# Patient Record
Sex: MALE | Race: BLACK OR AFRICAN AMERICAN | NOT HISPANIC OR LATINO | URBAN - METROPOLITAN AREA
[De-identification: names, ages, dates, MRNs, and addresses within clinical notes are randomized per-mention and may not be internally consistent; named-entity substitution may affect disease eponyms.]

---

## 2017-02-23 ENCOUNTER — OFFICE VISIT - HEALTHEAST (OUTPATIENT)
Dept: ADDICTION MEDICINE | Facility: CLINIC | Age: 37
End: 2017-02-23

## 2017-02-23 DIAGNOSIS — F10.20 MODERATE ALCOHOL USE DISORDER (H): ICD-10-CM

## 2017-03-03 ENCOUNTER — COMMUNICATION - HEALTHEAST (OUTPATIENT)
Dept: ADDICTION MEDICINE | Facility: CLINIC | Age: 37
End: 2017-03-03

## 2017-03-13 ENCOUNTER — OFFICE VISIT - HEALTHEAST (OUTPATIENT)
Dept: ADDICTION MEDICINE | Facility: CLINIC | Age: 37
End: 2017-03-13

## 2017-03-13 ENCOUNTER — COMMUNICATION - HEALTHEAST (OUTPATIENT)
Dept: ADDICTION MEDICINE | Facility: CLINIC | Age: 37
End: 2017-03-13

## 2017-03-13 DIAGNOSIS — F10.20 MODERATE ALCOHOL USE DISORDER (H): ICD-10-CM

## 2017-03-14 ENCOUNTER — OFFICE VISIT - HEALTHEAST (OUTPATIENT)
Dept: ADDICTION MEDICINE | Facility: CLINIC | Age: 37
End: 2017-03-14

## 2017-03-14 DIAGNOSIS — F10.20 MODERATE ALCOHOL USE DISORDER (H): ICD-10-CM

## 2017-03-15 ENCOUNTER — OFFICE VISIT - HEALTHEAST (OUTPATIENT)
Dept: ADDICTION MEDICINE | Facility: CLINIC | Age: 37
End: 2017-03-15

## 2017-03-15 DIAGNOSIS — F10.20 MODERATE ALCOHOL USE DISORDER (H): ICD-10-CM

## 2017-03-16 ENCOUNTER — OFFICE VISIT - HEALTHEAST (OUTPATIENT)
Dept: ADDICTION MEDICINE | Facility: CLINIC | Age: 37
End: 2017-03-16

## 2017-03-16 DIAGNOSIS — F10.20 MODERATE ALCOHOL USE DISORDER (H): ICD-10-CM

## 2017-03-17 ENCOUNTER — AMBULATORY - HEALTHEAST (OUTPATIENT)
Dept: ADDICTION MEDICINE | Facility: CLINIC | Age: 37
End: 2017-03-17

## 2017-03-20 ENCOUNTER — OFFICE VISIT - HEALTHEAST (OUTPATIENT)
Dept: ADDICTION MEDICINE | Facility: CLINIC | Age: 37
End: 2017-03-20

## 2017-03-20 DIAGNOSIS — F10.20 MODERATE ALCOHOL USE DISORDER (H): ICD-10-CM

## 2017-03-22 ENCOUNTER — OFFICE VISIT - HEALTHEAST (OUTPATIENT)
Dept: ADDICTION MEDICINE | Facility: CLINIC | Age: 37
End: 2017-03-22

## 2017-03-22 DIAGNOSIS — F10.20 MODERATE ALCOHOL USE DISORDER (H): ICD-10-CM

## 2017-03-23 ENCOUNTER — OFFICE VISIT - HEALTHEAST (OUTPATIENT)
Dept: ADDICTION MEDICINE | Facility: CLINIC | Age: 37
End: 2017-03-23

## 2017-03-23 DIAGNOSIS — F10.20 MODERATE ALCOHOL USE DISORDER (H): ICD-10-CM

## 2017-03-24 ENCOUNTER — AMBULATORY - HEALTHEAST (OUTPATIENT)
Dept: ADDICTION MEDICINE | Facility: CLINIC | Age: 37
End: 2017-03-24

## 2017-03-27 ENCOUNTER — AMBULATORY - HEALTHEAST (OUTPATIENT)
Dept: LAB | Facility: CLINIC | Age: 37
End: 2017-03-27

## 2017-03-27 ENCOUNTER — OFFICE VISIT - HEALTHEAST (OUTPATIENT)
Dept: ADDICTION MEDICINE | Facility: CLINIC | Age: 37
End: 2017-03-27

## 2017-03-27 DIAGNOSIS — F10.20 MODERATE ALCOHOL USE DISORDER (H): ICD-10-CM

## 2017-03-28 ENCOUNTER — OFFICE VISIT - HEALTHEAST (OUTPATIENT)
Dept: ADDICTION MEDICINE | Facility: CLINIC | Age: 37
End: 2017-03-28

## 2017-03-28 DIAGNOSIS — F10.20 MODERATE ALCOHOL USE DISORDER (H): ICD-10-CM

## 2017-03-29 ENCOUNTER — OFFICE VISIT - HEALTHEAST (OUTPATIENT)
Dept: ADDICTION MEDICINE | Facility: CLINIC | Age: 37
End: 2017-03-29

## 2017-03-29 DIAGNOSIS — F10.20 MODERATE ALCOHOL USE DISORDER (H): ICD-10-CM

## 2017-03-30 ENCOUNTER — OFFICE VISIT - HEALTHEAST (OUTPATIENT)
Dept: ADDICTION MEDICINE | Facility: CLINIC | Age: 37
End: 2017-03-30

## 2017-03-30 DIAGNOSIS — F10.20 MODERATE ALCOHOL USE DISORDER (H): ICD-10-CM

## 2017-03-31 ENCOUNTER — AMBULATORY - HEALTHEAST (OUTPATIENT)
Dept: ADDICTION MEDICINE | Facility: CLINIC | Age: 37
End: 2017-03-31

## 2017-04-03 ENCOUNTER — OFFICE VISIT - HEALTHEAST (OUTPATIENT)
Dept: ADDICTION MEDICINE | Facility: CLINIC | Age: 37
End: 2017-04-03

## 2017-04-03 DIAGNOSIS — F10.20 MODERATE ALCOHOL USE DISORDER (H): ICD-10-CM

## 2017-04-04 ENCOUNTER — OFFICE VISIT - HEALTHEAST (OUTPATIENT)
Dept: ADDICTION MEDICINE | Facility: CLINIC | Age: 37
End: 2017-04-04

## 2017-04-04 DIAGNOSIS — F10.20 MODERATE ALCOHOL USE DISORDER (H): ICD-10-CM

## 2017-04-06 ENCOUNTER — OFFICE VISIT - HEALTHEAST (OUTPATIENT)
Dept: ADDICTION MEDICINE | Facility: CLINIC | Age: 37
End: 2017-04-06

## 2017-04-06 DIAGNOSIS — F10.20 MODERATE ALCOHOL USE DISORDER (H): ICD-10-CM

## 2017-04-07 ENCOUNTER — AMBULATORY - HEALTHEAST (OUTPATIENT)
Dept: ADDICTION MEDICINE | Facility: CLINIC | Age: 37
End: 2017-04-07

## 2017-04-07 ENCOUNTER — COMMUNICATION - HEALTHEAST (OUTPATIENT)
Dept: ADDICTION MEDICINE | Facility: CLINIC | Age: 37
End: 2017-04-07

## 2017-04-11 ENCOUNTER — OFFICE VISIT - HEALTHEAST (OUTPATIENT)
Dept: ADDICTION MEDICINE | Facility: CLINIC | Age: 37
End: 2017-04-11

## 2017-04-11 DIAGNOSIS — F10.20 MODERATE ALCOHOL USE DISORDER (H): ICD-10-CM

## 2017-04-12 ENCOUNTER — OFFICE VISIT - HEALTHEAST (OUTPATIENT)
Dept: ADDICTION MEDICINE | Facility: CLINIC | Age: 37
End: 2017-04-12

## 2017-04-12 DIAGNOSIS — F10.20 MODERATE ALCOHOL USE DISORDER (H): ICD-10-CM

## 2017-04-13 ENCOUNTER — OFFICE VISIT - HEALTHEAST (OUTPATIENT)
Dept: ADDICTION MEDICINE | Facility: CLINIC | Age: 37
End: 2017-04-13

## 2017-04-13 DIAGNOSIS — F10.20 MODERATE ALCOHOL USE DISORDER (H): ICD-10-CM

## 2017-04-14 ENCOUNTER — AMBULATORY - HEALTHEAST (OUTPATIENT)
Dept: ADDICTION MEDICINE | Facility: CLINIC | Age: 37
End: 2017-04-14

## 2017-04-17 ENCOUNTER — OFFICE VISIT - HEALTHEAST (OUTPATIENT)
Dept: ADDICTION MEDICINE | Facility: CLINIC | Age: 37
End: 2017-04-17

## 2017-04-17 DIAGNOSIS — F10.20 MODERATE ALCOHOL USE DISORDER (H): ICD-10-CM

## 2017-04-18 ENCOUNTER — OFFICE VISIT - HEALTHEAST (OUTPATIENT)
Dept: ADDICTION MEDICINE | Facility: CLINIC | Age: 37
End: 2017-04-18

## 2017-04-18 DIAGNOSIS — F10.20 MODERATE ALCOHOL USE DISORDER (H): ICD-10-CM

## 2017-04-19 ENCOUNTER — OFFICE VISIT - HEALTHEAST (OUTPATIENT)
Dept: ADDICTION MEDICINE | Facility: CLINIC | Age: 37
End: 2017-04-19

## 2017-04-19 DIAGNOSIS — F10.20 MODERATE ALCOHOL USE DISORDER (H): ICD-10-CM

## 2017-04-20 ENCOUNTER — OFFICE VISIT - HEALTHEAST (OUTPATIENT)
Dept: ADDICTION MEDICINE | Facility: CLINIC | Age: 37
End: 2017-04-20

## 2017-04-20 ENCOUNTER — AMBULATORY - HEALTHEAST (OUTPATIENT)
Dept: ADDICTION MEDICINE | Facility: CLINIC | Age: 37
End: 2017-04-20

## 2017-04-20 DIAGNOSIS — F10.20 MODERATE ALCOHOL USE DISORDER (H): ICD-10-CM

## 2017-04-21 ENCOUNTER — AMBULATORY - HEALTHEAST (OUTPATIENT)
Dept: ADDICTION MEDICINE | Facility: CLINIC | Age: 37
End: 2017-04-21

## 2017-04-24 ENCOUNTER — OFFICE VISIT - HEALTHEAST (OUTPATIENT)
Dept: ADDICTION MEDICINE | Facility: CLINIC | Age: 37
End: 2017-04-24

## 2017-04-24 DIAGNOSIS — F10.20 MODERATE ALCOHOL USE DISORDER (H): ICD-10-CM

## 2017-04-25 ENCOUNTER — OFFICE VISIT - HEALTHEAST (OUTPATIENT)
Dept: ADDICTION MEDICINE | Facility: CLINIC | Age: 37
End: 2017-04-25

## 2017-04-25 DIAGNOSIS — F10.20 MODERATE ALCOHOL USE DISORDER (H): ICD-10-CM

## 2017-04-26 ENCOUNTER — OFFICE VISIT - HEALTHEAST (OUTPATIENT)
Dept: ADDICTION MEDICINE | Facility: CLINIC | Age: 37
End: 2017-04-26

## 2017-04-26 DIAGNOSIS — F10.20 MODERATE ALCOHOL USE DISORDER (H): ICD-10-CM

## 2017-04-27 ENCOUNTER — OFFICE VISIT - HEALTHEAST (OUTPATIENT)
Dept: ADDICTION MEDICINE | Facility: CLINIC | Age: 37
End: 2017-04-27

## 2017-04-27 ENCOUNTER — AMBULATORY - HEALTHEAST (OUTPATIENT)
Dept: ADDICTION MEDICINE | Facility: CLINIC | Age: 37
End: 2017-04-27

## 2017-04-27 DIAGNOSIS — F10.20 MODERATE ALCOHOL USE DISORDER (H): ICD-10-CM

## 2017-05-04 ENCOUNTER — OFFICE VISIT - HEALTHEAST (OUTPATIENT)
Dept: ADDICTION MEDICINE | Facility: CLINIC | Age: 37
End: 2017-05-04

## 2017-05-04 DIAGNOSIS — F10.20 MODERATE ALCOHOL USE DISORDER (H): ICD-10-CM

## 2017-05-05 ENCOUNTER — AMBULATORY - HEALTHEAST (OUTPATIENT)
Dept: ADDICTION MEDICINE | Facility: CLINIC | Age: 37
End: 2017-05-05

## 2017-05-08 ENCOUNTER — COMMUNICATION - HEALTHEAST (OUTPATIENT)
Dept: ADDICTION MEDICINE | Facility: CLINIC | Age: 37
End: 2017-05-08

## 2017-05-08 ENCOUNTER — OFFICE VISIT - HEALTHEAST (OUTPATIENT)
Dept: ADDICTION MEDICINE | Facility: CLINIC | Age: 37
End: 2017-05-08

## 2017-05-08 DIAGNOSIS — F10.20 MODERATE ALCOHOL USE DISORDER (H): ICD-10-CM

## 2017-05-11 ENCOUNTER — OFFICE VISIT - HEALTHEAST (OUTPATIENT)
Dept: ADDICTION MEDICINE | Facility: CLINIC | Age: 37
End: 2017-05-11

## 2017-05-11 DIAGNOSIS — F10.20 MODERATE ALCOHOL USE DISORDER (H): ICD-10-CM

## 2017-05-12 ENCOUNTER — AMBULATORY - HEALTHEAST (OUTPATIENT)
Dept: ADDICTION MEDICINE | Facility: CLINIC | Age: 37
End: 2017-05-12

## 2017-05-18 ENCOUNTER — AMBULATORY - HEALTHEAST (OUTPATIENT)
Dept: LAB | Facility: CLINIC | Age: 37
End: 2017-05-18

## 2017-05-18 ENCOUNTER — OFFICE VISIT - HEALTHEAST (OUTPATIENT)
Dept: ADDICTION MEDICINE | Facility: CLINIC | Age: 37
End: 2017-05-18

## 2017-05-18 DIAGNOSIS — F10.20 MODERATE ALCOHOL USE DISORDER (H): ICD-10-CM

## 2017-05-19 ENCOUNTER — AMBULATORY - HEALTHEAST (OUTPATIENT)
Dept: ADDICTION MEDICINE | Facility: CLINIC | Age: 37
End: 2017-05-19

## 2017-05-22 ENCOUNTER — OFFICE VISIT - HEALTHEAST (OUTPATIENT)
Dept: ADDICTION MEDICINE | Facility: CLINIC | Age: 37
End: 2017-05-22

## 2017-05-22 DIAGNOSIS — F10.20 MODERATE ALCOHOL USE DISORDER (H): ICD-10-CM

## 2017-05-25 ENCOUNTER — AMBULATORY - HEALTHEAST (OUTPATIENT)
Dept: ADDICTION MEDICINE | Facility: CLINIC | Age: 37
End: 2017-05-25

## 2017-06-01 ENCOUNTER — OFFICE VISIT - HEALTHEAST (OUTPATIENT)
Dept: ADDICTION MEDICINE | Facility: CLINIC | Age: 37
End: 2017-06-01

## 2017-06-01 DIAGNOSIS — F10.20 MODERATE ALCOHOL USE DISORDER (H): ICD-10-CM

## 2017-06-02 ENCOUNTER — AMBULATORY - HEALTHEAST (OUTPATIENT)
Dept: ADDICTION MEDICINE | Facility: CLINIC | Age: 37
End: 2017-06-02

## 2017-06-05 ENCOUNTER — OFFICE VISIT - HEALTHEAST (OUTPATIENT)
Dept: ADDICTION MEDICINE | Facility: CLINIC | Age: 37
End: 2017-06-05

## 2017-06-05 DIAGNOSIS — F10.20 MODERATE ALCOHOL USE DISORDER (H): ICD-10-CM

## 2017-06-09 ENCOUNTER — AMBULATORY - HEALTHEAST (OUTPATIENT)
Dept: ADDICTION MEDICINE | Facility: CLINIC | Age: 37
End: 2017-06-09

## 2017-06-12 ENCOUNTER — OFFICE VISIT - HEALTHEAST (OUTPATIENT)
Dept: ADDICTION MEDICINE | Facility: CLINIC | Age: 37
End: 2017-06-12

## 2017-06-12 DIAGNOSIS — F10.20 MODERATE ALCOHOL USE DISORDER (H): ICD-10-CM

## 2017-06-15 ENCOUNTER — OFFICE VISIT - HEALTHEAST (OUTPATIENT)
Dept: ADDICTION MEDICINE | Facility: CLINIC | Age: 37
End: 2017-06-15

## 2017-06-15 DIAGNOSIS — F10.20 MODERATE ALCOHOL USE DISORDER (H): ICD-10-CM

## 2017-06-16 ENCOUNTER — COMMUNICATION - HEALTHEAST (OUTPATIENT)
Dept: ADDICTION MEDICINE | Facility: CLINIC | Age: 37
End: 2017-06-16

## 2017-06-16 ENCOUNTER — AMBULATORY - HEALTHEAST (OUTPATIENT)
Dept: ADDICTION MEDICINE | Facility: CLINIC | Age: 37
End: 2017-06-16

## 2017-06-19 ENCOUNTER — COMMUNICATION - HEALTHEAST (OUTPATIENT)
Dept: ADDICTION MEDICINE | Facility: CLINIC | Age: 37
End: 2017-06-19

## 2017-06-19 ENCOUNTER — AMBULATORY - HEALTHEAST (OUTPATIENT)
Dept: ADDICTION MEDICINE | Facility: CLINIC | Age: 37
End: 2017-06-19

## 2017-06-27 ENCOUNTER — COMMUNICATION - HEALTHEAST (OUTPATIENT)
Dept: ADDICTION MEDICINE | Facility: CLINIC | Age: 37
End: 2017-06-27

## 2017-07-03 ENCOUNTER — AMBULATORY - HEALTHEAST (OUTPATIENT)
Dept: ADDICTION MEDICINE | Facility: CLINIC | Age: 37
End: 2017-07-03

## 2017-07-03 ENCOUNTER — OFFICE VISIT - HEALTHEAST (OUTPATIENT)
Dept: ADDICTION MEDICINE | Facility: CLINIC | Age: 37
End: 2017-07-03

## 2017-07-03 DIAGNOSIS — F10.20 MODERATE ALCOHOL USE DISORDER (H): ICD-10-CM

## 2017-07-05 ENCOUNTER — AMBULATORY - HEALTHEAST (OUTPATIENT)
Dept: ADDICTION MEDICINE | Facility: CLINIC | Age: 37
End: 2017-07-05

## 2017-07-06 ENCOUNTER — OFFICE VISIT - HEALTHEAST (OUTPATIENT)
Dept: ADDICTION MEDICINE | Facility: CLINIC | Age: 37
End: 2017-07-06

## 2017-07-06 DIAGNOSIS — F10.20 MODERATE ALCOHOL USE DISORDER (H): ICD-10-CM

## 2017-07-10 ENCOUNTER — AMBULATORY - HEALTHEAST (OUTPATIENT)
Dept: ADDICTION MEDICINE | Facility: CLINIC | Age: 37
End: 2017-07-10

## 2017-07-13 ENCOUNTER — AMBULATORY - HEALTHEAST (OUTPATIENT)
Dept: ADDICTION MEDICINE | Facility: CLINIC | Age: 37
End: 2017-07-13

## 2017-07-20 ENCOUNTER — OFFICE VISIT - HEALTHEAST (OUTPATIENT)
Dept: ADDICTION MEDICINE | Facility: CLINIC | Age: 37
End: 2017-07-20

## 2017-07-20 DIAGNOSIS — F10.20 MODERATE ALCOHOL USE DISORDER (H): ICD-10-CM

## 2017-07-24 ENCOUNTER — AMBULATORY - HEALTHEAST (OUTPATIENT)
Dept: ADDICTION MEDICINE | Facility: CLINIC | Age: 37
End: 2017-07-24

## 2017-07-27 ENCOUNTER — OFFICE VISIT - HEALTHEAST (OUTPATIENT)
Dept: ADDICTION MEDICINE | Facility: CLINIC | Age: 37
End: 2017-07-27

## 2017-07-27 DIAGNOSIS — F10.20 MODERATE ALCOHOL USE DISORDER (H): ICD-10-CM

## 2017-07-31 ENCOUNTER — AMBULATORY - HEALTHEAST (OUTPATIENT)
Dept: ADDICTION MEDICINE | Facility: CLINIC | Age: 37
End: 2017-07-31

## 2017-08-03 ENCOUNTER — OFFICE VISIT - HEALTHEAST (OUTPATIENT)
Dept: ADDICTION MEDICINE | Facility: CLINIC | Age: 37
End: 2017-08-03

## 2017-08-03 DIAGNOSIS — F10.20 MODERATE ALCOHOL USE DISORDER (H): ICD-10-CM

## 2017-08-07 ENCOUNTER — AMBULATORY - HEALTHEAST (OUTPATIENT)
Dept: ADDICTION MEDICINE | Facility: CLINIC | Age: 37
End: 2017-08-07

## 2017-08-17 ENCOUNTER — OFFICE VISIT - HEALTHEAST (OUTPATIENT)
Dept: ADDICTION MEDICINE | Facility: CLINIC | Age: 37
End: 2017-08-17

## 2017-08-17 DIAGNOSIS — F10.20 MODERATE ALCOHOL USE DISORDER (H): ICD-10-CM

## 2017-08-21 ENCOUNTER — AMBULATORY - HEALTHEAST (OUTPATIENT)
Dept: ADDICTION MEDICINE | Facility: CLINIC | Age: 37
End: 2017-08-21

## 2017-08-31 ENCOUNTER — OFFICE VISIT - HEALTHEAST (OUTPATIENT)
Dept: ADDICTION MEDICINE | Facility: CLINIC | Age: 37
End: 2017-08-31

## 2017-08-31 DIAGNOSIS — F10.20 MODERATE ALCOHOL USE DISORDER (H): ICD-10-CM

## 2017-09-04 ENCOUNTER — AMBULATORY - HEALTHEAST (OUTPATIENT)
Dept: ADDICTION MEDICINE | Facility: CLINIC | Age: 37
End: 2017-09-04

## 2017-09-07 ENCOUNTER — OFFICE VISIT - HEALTHEAST (OUTPATIENT)
Dept: ADDICTION MEDICINE | Facility: CLINIC | Age: 37
End: 2017-09-07

## 2017-09-07 DIAGNOSIS — F10.20 MODERATE ALCOHOL USE DISORDER (H): ICD-10-CM

## 2017-09-11 ENCOUNTER — AMBULATORY - HEALTHEAST (OUTPATIENT)
Dept: ADDICTION MEDICINE | Facility: CLINIC | Age: 37
End: 2017-09-11

## 2017-10-02 ENCOUNTER — COMMUNICATION - HEALTHEAST (OUTPATIENT)
Dept: ADDICTION MEDICINE | Facility: CLINIC | Age: 37
End: 2017-10-02

## 2017-10-05 ENCOUNTER — COMMUNICATION - HEALTHEAST (OUTPATIENT)
Dept: ADDICTION MEDICINE | Facility: CLINIC | Age: 37
End: 2017-10-05

## 2017-10-11 ENCOUNTER — AMBULATORY - HEALTHEAST (OUTPATIENT)
Dept: ADDICTION MEDICINE | Facility: CLINIC | Age: 37
End: 2017-10-11

## 2017-10-12 ENCOUNTER — OFFICE VISIT - HEALTHEAST (OUTPATIENT)
Dept: ADDICTION MEDICINE | Facility: CLINIC | Age: 37
End: 2017-10-12

## 2017-10-12 DIAGNOSIS — F10.20 MODERATE ALCOHOL USE DISORDER (H): ICD-10-CM

## 2017-10-16 ENCOUNTER — AMBULATORY - HEALTHEAST (OUTPATIENT)
Dept: ADDICTION MEDICINE | Facility: CLINIC | Age: 37
End: 2017-10-16

## 2017-10-19 ENCOUNTER — OFFICE VISIT - HEALTHEAST (OUTPATIENT)
Dept: ADDICTION MEDICINE | Facility: CLINIC | Age: 37
End: 2017-10-19

## 2017-10-19 DIAGNOSIS — F10.20 MODERATE ALCOHOL USE DISORDER (H): ICD-10-CM

## 2017-10-25 ENCOUNTER — AMBULATORY - HEALTHEAST (OUTPATIENT)
Dept: ADDICTION MEDICINE | Facility: CLINIC | Age: 37
End: 2017-10-25

## 2017-10-26 ENCOUNTER — OFFICE VISIT - HEALTHEAST (OUTPATIENT)
Dept: ADDICTION MEDICINE | Facility: CLINIC | Age: 37
End: 2017-10-26

## 2017-10-26 DIAGNOSIS — F10.20 MODERATE ALCOHOL USE DISORDER (H): ICD-10-CM

## 2017-11-01 ENCOUNTER — AMBULATORY - HEALTHEAST (OUTPATIENT)
Dept: ADDICTION MEDICINE | Facility: CLINIC | Age: 37
End: 2017-11-01

## 2017-11-09 ENCOUNTER — OFFICE VISIT - HEALTHEAST (OUTPATIENT)
Dept: ADDICTION MEDICINE | Facility: CLINIC | Age: 37
End: 2017-11-09

## 2017-11-09 DIAGNOSIS — F10.20 MODERATE ALCOHOL USE DISORDER (H): ICD-10-CM

## 2017-11-13 ENCOUNTER — AMBULATORY - HEALTHEAST (OUTPATIENT)
Dept: ADDICTION MEDICINE | Facility: CLINIC | Age: 37
End: 2017-11-13

## 2017-11-16 ENCOUNTER — OFFICE VISIT - HEALTHEAST (OUTPATIENT)
Dept: ADDICTION MEDICINE | Facility: CLINIC | Age: 37
End: 2017-11-16

## 2017-11-16 DIAGNOSIS — F10.20 MODERATE ALCOHOL USE DISORDER (H): ICD-10-CM

## 2017-11-20 ENCOUNTER — AMBULATORY - HEALTHEAST (OUTPATIENT)
Dept: ADDICTION MEDICINE | Facility: CLINIC | Age: 37
End: 2017-11-20

## 2021-06-09 NOTE — PROGRESS NOTES
Intake Note:   D) Morales Velásquez is a 36 y.o.  single Black or  male who is referred to Day intensive outpatient treatment via [facility] with funding from [facility]. Patient orientated x 3. Patient meets criteria for f10.20, Alcohol Use Disorder; Moderate.  Patient appears appropriate for DOP IOP.   A)Completed intake assessment; preliminary paperwork; counselor & supervisor license number and contact info., Patient Bill of Rights, , program rules/regulations, , Abuse Prevention Plan,, confidentiality & HIPPA policies, , grievance procedure,, presented ROIs, , TB & HIV/AIDS policies & resources, and Vulnerable Adult policy, .   Conducted Vulnerable Adult Assessment yes .   R)No special Vulnerable Adult needed at this time. .   Patient signed and agreed to counselor & supervisor license number and contact info., Patient Bill of Rights, , group rules/regulations, , Abuse Prevention Plan,, confidentiality & HIPPA policies, , grievance procedure,, presented ROIs, TB & HIV/AIDS policies & resources, and Vulnerable Adult policy. Patient scored Lower Risk on PANSI screen.   Dimension #1 - Withdrawal Potential - No concern. Level 0  Client reports weekly use of alcohol which resulted in a DUI charge.  Client reports last date of use being 1/22/17.  Client denies current withdrawal issues.  Dimension #2 - Biomedical - No concern.-Level 0  Client denies current medical issues.  Client denies a current primary care clinic or physician.  Client has health insurance and access to healthcare services.  Dimension #3 - Emotional , Behavioral and Cognitive - No concern. Level 0   Client denies mental health diagnosis or provider.  Client denies history or current psychological significant stressors.  Client denies history or abuse.  Client denies history or current SI/SIB.  Dimension #4 - Treatment Resistance - Moderate concern. Level 2  Client has external motivation for treatment services through  "probation.  Client has been using alcohol and received a DUI while on probation.  Client refers to self as a \"social\" alcohol user.  Client reports willingness to participate in treatment services.  Dimension #5 - Relapse Potential - Moderate concern. Level 2  Client reports longest period of abstinence from substance use has been 3.5 years due to incarceration.  Client has been through treatment in the past due to marijuana use.  Client had continued to use alcohol despite probation.  Client's last date of use has been challenged from collateral on original assessment.  Dimension #6 - Recovery Environment - Serious concern. Level 3  Client is unemployed with no form of income.  Client has no current daily structure.  Client is currently residing with sister.  Client is on probation for possession of a firearm and received a DUI on 1/22/17,  T) Explained counselor & supervisor license number and contact info., Patient Bill of Rights, , program rules/regulations, , Abuse Prevention Plan,, confidentiality & HIPPA policies, , grievance procedure,, presented ROIs, , TB & HIV/AIDS policies & resources, and Vulnerable Adult policy.    "

## 2021-06-09 NOTE — PROGRESS NOTES
[facility]   Mental Health and Addiction Care   [facility], [facility], and [facility]    [phone] or [phone]   Master Plan     Client Name:  Morales Velásquez   MRN: [id number]    Counselor: Gina Galindo      Title: Dimension #1 - Withdrawal Potential - No concern. Level 0  Plan Date:   3/17/2017  Diagnosis: Alcohol Use Disorder, Moderate  Problem:Client reports weekly use of alcohol which resulted in a DUI charge. Client reports last date of use being 1/22/17. Client denies current withdrawal issues.    Goal: Begin Date: 3/17/17 Target Date: 6/17/17  Maintain abstinence throughout  Outpatient Treatment in order to avoid experiencing withdrawal symptoms and to meet OP expectations.   Method 1: Begin Date:3/17/17 Target Date: 6/17/17 Date Completed:   Attend OP groups as directed and share thoughts, feelings and urges to use, as well as sober supports with staff and peers in order to maintain awareness of details shaping your recovery process.  Method 2: Begin Date:3/17/17 Target Date: 6/17/17 Date Completed:   The patient is to comply with all staff requests for UA's or breathalyzers.       Title: Dimension #2 - Biomedical - No concern.-Level 0   Plan Date:   3/17/2017  Diagnosis: Alcohol Use Disorder, Moderate  Problem: Client denies current medical issues. Client denies a current primary care clinic or physician. Client has health insurance and access to healthcare services.    Goal: Begin Date: 3/17/17 Target Date: 6/17/17  Practice living a healthy lifestyle on a daily basis with proper rest, nutrition and exercise.   Method 1: Begin Date:3/17/17 Target Date: 6/17/17 Date Completed:   Continue to follow recommendations from your personal care provider regarding medical issues. Inform staff immediately of any changes in your health that may affect your active participation in group therapy or attendance.    Is Nicotine dependence indicated on the assessment?   Method 2: Begin Date:3/17/17  "Target Date: 6/17/17 Date Completed:  Staff will provide client with information on tobacco cessation,and tools for quitting.       Title: Dimension #3 - Emotional , Behavioral and Cognitive - No concern. Level 0    Plan Date:   3/17/2017  Diagnosis: Alcohol Use Disorder, Moderate  Problem:Client denies mental health diagnosis or provider. Client denies history or current psychological significant stressors. Client denies history or abuse. Client denies history or current SI/SIB.    Goal: Begin Date: 3/17/17 Target Date: 6/17/17   To treat mental health effectively while attending treatment in order to increase your ability to meet goals and treatment expectations.   Method 1: Begin Date:3/17/17 Target Date: 6/17/17 Date Completed:   Begin to journal on a daily basis recording feelings and event of the day. Reflecting on this daily. Report in group how this is beneficial.  Method 2: Begin Date:3/17/17 Target Date: 6/17/17 Date Completed:   Identify 3 personal traits you like about yourself and 3 that you would like to see changes in. Develop a plan to initiate those changes. What will need to happen for these goals to be successful? Share this with counselor.       Title: Dimension #4 - Treatment Resistance - Moderate concern. Level 2   Plan Date:   3/17/2017  Diagnosis:   Alcohol Use Disorder, Moderate  Problem: Client has external motivation for treatment services through probation. Client has been using alcohol and received a DUI while on probation. Client refers to self as a \"social\" alcohol user. Client reports willingness to participate in treatment services.    Goal: Begin Date: 3/17/17 Target Date: 6/17/17  To follow through with intentions to treat chemical dependency concerns while meeting OP treatment expectations in order to graduate successfully from our program.   Method 1: Begin Date:3/17/17 Target Date: 5/16/17 Date Completed:   Complete 1st Step (Use History and Consequences) assignment while in " "Phase I of treatment and present to peers in group. Reflect on feedback received from peers and report findings to staff.   Method 2: Begin Date:3/17/17 Target Date: 6/17/17  Date Completed:   Complete all written assignments as assigned by staff including your \"goodbye letter\" and \"relapse prevention plan\" prior to graduation.  Contact staff with questions or concerns about written and oral assignments while attending group therapy.  Method 3: Begin Date:3/17/17 Target Date: 6/17/17 Date Completed:   If for any reason you cannot attend group therapy or you will be tardy, contact staff as soon as possible at [phone] or [phone]. Three unexcused absences  is considered voluntary discharge from the outpatient program.      Title: Dimension #5 - Relapse Potential - Moderate concern. Level 2   Plan Date:   3/17/2017  Diagnosis:   Alcohol Use Disorder, Moderate  Problem:Client reports longest period of abstinence from substance use has been 3.5 years due to incarceration. Client has been through treatment in the past due to marijuana use. Client had continued to use alcohol despite probation. Client's last date of use has been challenged from collateral on original assessment.    Goal: Begin Date: 3/17/17Target Date: 6/17/17  To deal effectively with relapse triggers and stressors while building coping skills in order to handle life events without resorting to drug/alcohol use.   Method 1: Begin Date:3/17/17 Target Date: 6/17/17 Date Completed:   Participate in OP group check-ins consistently as way of increasing self-awareness and connecting honestly with staff and peers.   Method 2: Begin Date:3/17/17 Target Date: 6/17/17 Date Completed:   Develop a list of 7 triggers to your use and identify 10 coping skills you can use to arrest the urge to use. Submit to counselor when finished.    Method 3: Begin Date:3/17/17 Target Date: 6/17/17Date Completed:   Report any relapses, if any, on any substances of abuse to " staff immediately.       Title:Dimension #6 - Recovery Environment - Serious concern. Level 3  Plan Date:   3/17/2017  Diagnosis: Alcohol Use Disorder, Moderate  Problem: Client is unemployed with no form of income. Client has no current daily structure. Client is currently residing with sister. Client is on probation for possession of a firearm and received a DUI on 1/22/17,    Goal: Begin Date: 3/17/17 Target Date: 6/17/17  To build meaningful structure into your weekly schedule by attending specific recovery activities on a daily basis   Method 1: Begin Date:3/17/17 Target Date: 6/17/17 Date Completed:   Find 2 sober support groups you feel comfortable attending on a weekly basis and inform counselor how these meetings are impacting you.Obtain a sponsor before 2nd phase of treatment.    Method 2: Begin Date:3/17/17 Target Date: 6/17/17  Date Completed:   Develop a Bucket list. What activities would you like to take part in. Set goals in intervals.  such as 3 month, 6 month,1 year, 3 years and 5 years, what barriers do you identify at this time for these goals to happen.   Method 3: Begin Date:3/17/17 Target Date: 6/17/17  Date Completed:   Invite a family member or concerned other who is supportive of your recovery to meet for a family session with your primary counselor, in the effort to help them understand addiction and the causes and to gain knowledge of self care for themselves and for your recovery.      By signing this document, I am acknowledging that I was actively and directly involved in the development of my treatment plan.   I have been a participant in the creation of my individual treatment plan.       Client Signature_________________________________________         Date__________________         Staff Signature Gina Galindo 3/17/2017

## 2021-06-09 NOTE — PROGRESS NOTES
"Weekly Progress Note  Morales Velásquez  1980  [id number]      D) Pt attended 3 groups  this week with 0 absences. A) Staff facilitated groups and reviewed tx progress. Assessed for [facility]. R) No VAP needed at this time. Pt working on the following dimensions:  Dimension #1 - Withdrawal Potential - No concern. Level 0 Client reports weekly use of alcohol which resulted in a DUI charge. Client reports last date of use being 1/22/17. Client denies current withdrawal issues. The patient is encouraged to maintain abstinence (TXPlan 1) The patient has not reported any use in the last 7 days.   Dimension #2 - Biomedical - No concern.-Level 0 Client denies current medical issues. Client denies a current primary care clinic or physician. Client has health insurance and access to healthcare services.The patient is encouraged to develop a healthy lifestyle by implementing proper nutrition, exercise, and healthy sleeping habits (TXPlan 2). Prior to treatment the patient had a metal plate put in his jaw, he reports that the screw came out and he is having concerns about this.  He reports he had the surgery done at [facility] and that he is able to set up an appointment for this procedure.  The patient is encouraged to deal with this matter in a prompt manner so he is no longer in pain.    Dimension #3 - Emotional , Behavioral and Cognitive - No concern. Level 0  Client denies mental health diagnosis or provider. Client denies history or current psychological significant stressors. Client denies history or abuse. Client denies history or current SI/SIB. The patient will be encouraged to look at mental health and manage it effectively (TXplan 3).   Dimension #4 - Treatment Resistance - Moderate concern. Level 2 Client has external motivation for treatment services through probation. Client has been using alcohol and received a DUI while on probation. Client refers to self as a \"social\" alcohol user. Client reports willingness " to participate in treatment services. The patient will be given his assignment packet with his use history and written assignments next week (TXPlan 4). The patient has been late to group almost every day this week, he was given notice that if he does not show up on time he will be put on a behavioral contract.  The patient did miss group 1x this week as a no call no show.  The patient has been on time and in groups other than the 1 day this week.    Dimension #5 - Relapse Potential - Moderate concern. Level 2 Client reports longest period of abstinence from substance use has been 3.5 years due to incarceration. Client has been through treatment in the past due to marijuana use. Client had continued to use alcohol despite probation. Client's last date of use has been challenged from collateral on original assessment. At this time the patient is at a moderately high vulnerability for use.     Dimension #6 - Recovery Environment - Serious concern. Level 3 Client is unemployed with no form of income. Client has no current daily structure. Client is currently residing with sister. The patient reports that he has support from his family and mainly his children.  He will be encouraged to develop daily structure that will include building sober support outside of his family, attending meetings (TXPlan 6). The patient is encouraged to attend at minimum 2 recovery meetings per week.  The patient reports that he would like to take care of his student loan debt, the patient was told that he needed to call the company and find out to get the loans into deferment rather than default.    T) Client educated on mental health. Client has completed 39 of 84 hours of program at this time. Projected discharge date is 6/13/17. Current discharge plan is relapse prevention group.     Gina Galindo        Psycho-Educational Curriculum  Date Attended  Psycho-Educational Curriculum  Date Attended    Acceptance   Shame/Guilt     1st  "Step   Anger/Rage     Affirmations   Mental Health     Automatic Negative Thoughts   Anxiety     Cross Addiction   Co-Occurring Disorders     Stages of Change   Yany/Bipolar     Relapse   Trauma      Addictive Thoughts   Victim Identity       Pleasure Unwoven  4/3     Mental Health Research Project 4/4     No Kidding Me 2  4/5     Mental Health Jeopardy  4/6   Coping Skills   Sober Structure     Relapse Prevention   Continuum of Care     Medical Aspects   Non-12 Step Support     Brain/Neurotransmitters  3/15 Priorities     Medication Compliance   Spirituality     Alcohol/Drug Research project 3/14 Weekend Planner     Physical stress symptoms 3/13 Educational Videos     Domenic's story 3/13 1st Step     What drugs do in your body 3/16 2nd Step     Sexual Health   Assertive Communication     Short-Term/Long-Term Effects   My name is J Carlos BERMUDEZ    Relationships  3/20 Cross Addiction     Assertive Communication   God As We Understood Him     Boundaries  3/22 HBO Relapse     Codependence    HBO What Is Addiction     Defense Mechanisms    Medical Aspects 1     Family Roles  3/22 Medical Aspects 2     Goodbye Letter   [employer]: Stress     Listening Skills  3/21     Intimacy   PBS Depression Out of the Shadows     Needs/Dealbreakers in Relationships  3/23 The Anonymous People    Socialization Skills   [place]     Feelings  3/28 Andriy Loco \"Highjacked Brain\"    Anger 3/27     Emotional Regualtion 3/29     What is in your bag 3/30     ABC Model of Emotion   Pritesh Harvey Humor in Tx    Grief and Loss   The Mindfulness Movie    Healthy vs. Unhealthy Feelings   J Carlos BERMUDEZ documentary     Meditation/Mindfulness       Overconfidence/Complacency       Resentments       Stress         "

## 2021-06-09 NOTE — PROGRESS NOTES
"Weekly Progress Note  Morales Velásquez  1980  [id number]      D) Pt attended 3 groups  this week with 0 absences. A) Staff facilitated groups and reviewed tx progress. Assessed for [facility]. R) No VAP needed at this time. Pt working on the following dimensions:  Dimension #1 - Withdrawal Potential - No concern. Level 0 Client reports weekly use of alcohol which resulted in a DUI charge. Client reports last date of use being 1/22/17. Client denies current withdrawal issues. The patient is encouraged to maintain abstinence (TXPlan 1)   Dimension #2 - Biomedical - No concern.-Level 0 Client denies current medical issues. Client denies a current primary care clinic or physician. Client has health insurance and access to healthcare services.The patient is encouraged to develop a healthy lifestyle by implementing proper nutrition, exercise, and healthy sleeping habits (TXPlan 2).   Dimension #3 - Emotional , Behavioral and Cognitive - No concern. Level 0  Client denies mental health diagnosis or provider. Client denies history or current psychological significant stressors. Client denies history or abuse. Client denies history or current SI/SIB. The patient will be encouraged to look at mental health and manage it effectively (TXplan 3).   Dimension #4 - Treatment Resistance - Moderate concern. Level 2 Client has external motivation for treatment services through probation. Client has been using alcohol and received a DUI while on probation. Client refers to self as a \"social\" alcohol user. Client reports willingness to participate in treatment services. The patient will be given his assignment packet with his use history and written assignments next week (TXPlan 4).   Dimension #5 - Relapse Potential - Moderate concern. Level 2 Client reports longest period of abstinence from substance use has been 3.5 years due to incarceration. Client has been through treatment in the past due to marijuana use. Client had continued to use " alcohol despite probation. Client's last date of use has been challenged from collateral on original assessment.   Dimension #6 - Recovery Environment - Serious concern. Level 3 Client is unemployed with no form of income. Client has no current daily structure. Client is currently residing with sister. The patient reports that he has support from his family and mainly his children.  He will be encouraged to develop daily structure that will include building sober support outside of his family, attending meetings (TXPlan 6).   T) Client educated on medical aspects . Client has completed 9 of 84 hours of program at this time. Projected discharge date is 6/13/17. Current discharge plan is relapse prevention group.     Gina Galindo        Psycho-Educational Curriculum  Date Attended  Psycho-Educational Curriculum  Date Attended    Acceptance   Shame/Guilt     1st Step   Anger/Rage     Affirmations   Mental Health     Automatic Negative Thoughts   Anxiety     Cross Addiction   Co-Occurring Disorders     Stages of Change   Yany/Bipolar     Relapse   Trauma      Addictive Thoughts   Victim Identity     Coping Skills   Sober Structure     Relapse Prevention   Continuum of Care     Medical Aspects   Non-12 Step Support     Brain/Neurotransmitters  3/15 Priorities     Medication Compliance   Spirituality     Alcohol/Drug Research project 3/14 Weekend Planner     Physical stress symptoms 3/13 Educational Videos     Domenic's story 3/13 1st Step     What drugs do in your body 3/16 2nd Step     Sexual Health   Assertive Communication     Short-Term/Long-Term Effects   My name is J Carlos SUMMERSHollie    Relationships   Cross Addiction     Assertive Communication   God As We Understood Him     Boundaries   HBO Relapse     Codependence    HBO What Is Addiction     Defense Mechanisms    Medical Aspects 1     Family Roles   Medical Aspects 2     Goodbye Letter   [employer]: Stress     Intimacy   PBS Depression Out of the Shadows    "  Needs/Dealbreakers in Relationships   The Anonymous People    Socialization Skills   [place]     Feelings   Andriy Loco \"Highjacked Brain\"    ABC Model of Emotion   Pritesh Harvey Humor in [place]    Grief and Loss   The Mindfulness Movie    Healthy vs. Unhealthy Feelings   J Carlos BERMUDEZ documentary     Meditation/Mindfulness       Overconfidence/Complacency       Resentments       Stress         "

## 2021-06-09 NOTE — PROGRESS NOTES
Addiction Services - Initial Services Plan   Name:Morales Velásquez   : 1980   MRN: [id number]     Goal  Methods    1. Acceptance of chemical dependency and mental illness as a disease.  A. Comply with all med/psych recommendations   B. Complete preliminary interviews   C. Attend all program functions   D. Attend all individual counseling sessions   E. Read all assigned literature   F. Complete psychological testing as assigned   G. Particpate in any necessary consultations    2. Acceptance of my need and ability to change  A. Participate in conferences (P.O., , family)   B. Actively participate in treatment planning and reviews   E. Complete all assignments given or recommended on Treatment Plan   F. Present Drug History/Peer Assessment with peer group    3. Acceptance of staff recommendations as a means to my recovery  A. Participate in all interviews for Continuum of Care Plans   B. Arrive to group when scheduled and on-time.     Patient describes their immediate need: To learn sober living skills to prevent relapse.     Are there any immediate Safety Needs such as (physical, stability, mobility):  Pt is able to get medical care as needed. Pt denies immediate concerns.     Immediate Health Needs and Plan:   Remain clean and sober and attend first group therapy session on 3/14/17.    Vulnerable Adult: No     [ ] Continue Current Medications for:    [ ] Request Consult for:   [ ] Notify Attending Physician about:   [ ] Other:   Issues to be addressed i the first sessions:   Treatment planning, orientation to group norms and rules, and welcomed by peers.     Patient strengths and needs:   Strengths identified as listener, fast learner.  Needs identified as working on commitment.    Plan for patient for time between intake and completion of the treatment plan:   Attend all group therapy sessions as directed, complete all written and oral assignments as directed, and remain clean and sober. A  relapse, if any, must be reported to staff immediately in order to ensure you are receiving the proper level of care. If you cannot attend a group therapy session you must call contact information provided in intake folder and leave a message before or during group hours.     Staff Members' Titles authorized to Initiate Services are:   Director of Behavioral Service   Clinical Director of Chemical Dependency   Primary Counselor   MI/ANGI Antunez. Counselor      Nursing Staff    Vulnerable Adult Review   [X] Review of the facility Abuse Prevention plan was reviewed with the patient   [X] No individual abuse plan is necessary   [ ] In addition to the facility Abuse Prevention plan, an Individual Abuse Plan will be put in place     I understand these goals to be the Treatment Goals of the Program, and I agree to the stated Methods in attempting to accomplish these goals.     Patient Signature: _________________________Date: 3/13/2017       Staff Name/Title: AGUSTO Rodriguez Date: 3/13/2017   Time: 2:31 PM

## 2021-06-09 NOTE — PROGRESS NOTES
"Weekly Progress Note  Morales Velásquez  1980  [id number]      D) Pt attended 3 groups  this week with 1 absences. A) Staff facilitated groups and reviewed tx progress. Assessed for [facility]. R) No VAP needed at this time. Pt working on the following dimensions:  Dimension #1 - Withdrawal Potential - No concern. Level 0 Client reports weekly use of alcohol which resulted in a DUI charge. Client reports last date of use being 1/22/17. Client denies current withdrawal issues. The patient is encouraged to maintain abstinence (TXPlan 1) The patient has not reported any use in the last 7 days.   Dimension #2 - Biomedical - No concern.-Level 0 Client denies current medical issues. Client denies a current primary care clinic or physician. Client has health insurance and access to healthcare services.The patient is encouraged to develop a healthy lifestyle by implementing proper nutrition, exercise, and healthy sleeping habits (TXPlan 2).   Dimension #3 - Emotional , Behavioral and Cognitive - No concern. Level 0  Client denies mental health diagnosis or provider. Client denies history or current psychological significant stressors. Client denies history or abuse. Client denies history or current SI/SIB. The patient will be encouraged to look at mental health and manage it effectively (TXplan 3).   Dimension #4 - Treatment Resistance - Moderate concern. Level 2 Client has external motivation for treatment services through probation. Client has been using alcohol and received a DUI while on probation. Client refers to self as a \"social\" alcohol user. Client reports willingness to participate in treatment services. The patient will be given his assignment packet with his use history and written assignments next week (TXPlan 4). The patient has been late to group almost every day this week, he was given notice that if he does not show up on time he will be put on a behavioral contract.    Dimension #5 - Relapse Potential - " Moderate concern. Level 2 Client reports longest period of abstinence from substance use has been 3.5 years due to incarceration. Client has been through treatment in the past due to marijuana use. Client had continued to use alcohol despite probation. Client's last date of use has been challenged from collateral on original assessment. At this time the patient is at a moderately high vulnerability for use.     Dimension #6 - Recovery Environment - Serious concern. Level 3 Client is unemployed with no form of income. Client has no current daily structure. Client is currently residing with sister. The patient reports that he has support from his family and mainly his children.  He will be encouraged to develop daily structure that will include building sober support outside of his family, attending meetings (TXPlan 6). The patient is encouraged to attend at minimum 2 recovery meetings per week.    T) Client educated on relationships. Client has completed 17 of 84 hours of program at this time. Projected discharge date is 6/13/17. Current discharge plan is relapse prevention group.     Gina Galindo        Psycho-Educational Curriculum  Date Attended  Psycho-Educational Curriculum  Date Attended    Acceptance   Shame/Guilt     1st Step   Anger/Rage     Affirmations   Mental Health     Automatic Negative Thoughts   Anxiety     Cross Addiction   Co-Occurring Disorders     Stages of Change   Yany/Bipolar     Relapse   Trauma      Addictive Thoughts   Victim Identity     Coping Skills   Sober Structure     Relapse Prevention   Continuum of Care     Medical Aspects   Non-12 Step Support     Brain/Neurotransmitters  3/15 Priorities     Medication Compliance   Spirituality     Alcohol/Drug Research project 3/14 Weekend Planner     Physical stress symptoms 3/13 Educational Videos     Domenic's story 3/13 1st Step     What drugs do in your body 3/16 2nd Step     Sexual Health   Assertive Communication    "  Short-Term/Long-Term Effects   My name is J Carlos BERMUDEZ    Relationships  3/20 Cross Addiction     Assertive Communication   God As We Understood Him     Boundaries  3/22 HBO Relapse     Codependence    HBO What Is Addiction     Defense Mechanisms    Medical Aspects 1     Family Roles  3/22 Medical Aspects 2     Goodbye Letter   [employer]: Stress     Listening Skills  3/21     Intimacy   PBS Depression Out of the [place]adows     Needs/Dealbreakers in Relationships  3/23 The Anonymous People    Socialization Skills   [place]     Feelings   Andriy Loco \"Highjacked Brain\"    ABC Model of Emotion   Pritesh Harvey Humor in [place]    Grief and Loss   The Mindfulness Movie    Healthy vs. Unhealthy Feelings   J Carlos BERMUDEZ documentary     Meditation/Mindfulness       Overconfidence/Complacency       Resentments       Stress         "

## 2021-06-09 NOTE — PROGRESS NOTES
1:1 Session    The patient and this writer met for a 1 hour 1:1 session to discuss the patients treatment plan and to review goals.  The patient reports he is here because he got a DWI in January and was on probation prior to that for gun related charges.  The patient reports that he is very open to learning new life skills and skills to prevent further use.  The patient reports that he has 2 young children and wants to set a good example for them as he did not have anyone to look up to as a young child.  The patient reports that his grandmother was a chronic alcoholic and his dad was a drug user.  At this time the patient does express a want to change his lifestyle to make something better for his children.  The patient did identify some dental concerns at this time that he would like help addressing and would like to set a goal to get his student loans deferred.  The patient appeared to be stable at this time and does not endorse any thoughts of harming himself or others.        AGUSTO Ventura    3/28/2017

## 2021-06-09 NOTE — PROGRESS NOTES
"Weekly Progress Note  Morales Velásquez  1980  [id number]      D) Pt attended 4 groups  this week with 0 absences. A) Staff facilitated groups and reviewed tx progress. Assessed for [facility]. R) No VAP needed at this time. Pt working on the following dimensions:  Dimension #1 - Withdrawal Potential - No concern. Level 0 Client reports weekly use of alcohol which resulted in a DUI charge. Client reports last date of use being 1/22/17. Client denies current withdrawal issues. The patient is encouraged to maintain abstinence (TXPlan 1) The patient has not reported any use in the last 7 days.   Dimension #2 - Biomedical - No concern.-Level 0 Client denies current medical issues. Client denies a current primary care clinic or physician. Client has health insurance and access to healthcare services.The patient is encouraged to develop a healthy lifestyle by implementing proper nutrition, exercise, and healthy sleeping habits (TXPlan 2).   Dimension #3 - Emotional , Behavioral and Cognitive - No concern. Level 0  Client denies mental health diagnosis or provider. Client denies history or current psychological significant stressors. Client denies history or abuse. Client denies history or current SI/SIB. The patient will be encouraged to look at mental health and manage it effectively (TXplan 3).   Dimension #4 - Treatment Resistance - Moderate concern. Level 2 Client has external motivation for treatment services through probation. Client has been using alcohol and received a DUI while on probation. Client refers to self as a \"social\" alcohol user. Client reports willingness to participate in treatment services. The patient will be given his assignment packet with his use history and written assignments next week (TXPlan 4). The patient has been late to group almost every day this week, he was given notice that if he does not show up on time he will be put on a behavioral contract.  The patient attended all groups this week " and was on time, this is a major improvement as the patient was on average 15 to 30 minutes late.    Dimension #5 - Relapse Potential - Moderate concern. Level 2 Client reports longest period of abstinence from substance use has been 3.5 years due to incarceration. Client has been through treatment in the past due to marijuana use. Client had continued to use alcohol despite probation. Client's last date of use has been challenged from collateral on original assessment. At this time the patient is at a moderately high vulnerability for use.     Dimension #6 - Recovery Environment - Serious concern. Level 3 Client is unemployed with no form of income. Client has no current daily structure. Client is currently residing with sister. The patient reports that he has support from his family and mainly his children.  He will be encouraged to develop daily structure that will include building sober support outside of his family, attending meetings (TXPlan 6). The patient is encouraged to attend at minimum 2 recovery meetings per week.  The patient reports that he would like to take care of his student loan debt, the patient was told that he needed to call the company and find out to get the loans into deferment rather than default.    T) Client educated on feelings. Client has completed 30 of 84 hours of program at this time. Projected discharge date is 6/13/17. Current discharge plan is relapse prevention group.     Gina Galindo        Psycho-Educational Curriculum  Date Attended  Psycho-Educational Curriculum  Date Attended    Acceptance   Shame/Guilt     1st Step   Anger/Rage     Affirmations   Mental Health     Automatic Negative Thoughts   Anxiety     Cross Addiction   Co-Occurring Disorders     Stages of Change   Yany/Bipolar     Relapse   Trauma      Addictive Thoughts   Victim Identity     Coping Skills   Sober Structure     Relapse Prevention   Continuum of Care     Medical Aspects   Non-12 Step Support    "  Brain/Neurotransmitters  3/15 Priorities     Medication Compliance   Spirituality     Alcohol/Drug Research project 3/14 Weekend Planner     Physical stress symptoms 3/13 Educational Videos     Domenic's story 3/13 1st Step     What drugs do in your body 3/16 2nd Step     Sexual Health   Assertive Communication     Short-Term/Long-Term Effects   My name is J Carlos SUMMERSHollie    Relationships  3/20 Cross Addiction     Assertive Communication   God As We Understood Him     Boundaries  3/22 HBO Relapse     Codependence    HBO What Is Addiction     Defense Mechanisms    Medical Aspects 1     Family Roles  3/22 Medical Aspects 2     Goodbye Letter   [employer]: Stress     Listening Skills  3/21     Intimacy   PBS Depression Out of the [place]adows     Needs/Dealbreakers in Relationships  3/23 The Anonymous People    Socialization Skills   [place]     Feelings  3/28 Andryi Loco \"Highjacked Brain\"    Anger 3/27     Emotional Regualtion 3/29     What is in your bag 3/30     ABC Model of Emotion   Pritesh Harvey Humor in [place]    Grief and Loss   The Mindfulness Movie    Healthy vs. Unhealthy Feelings   J Carlos BERMUDEZ documentary     Meditation/Mindfulness       Overconfidence/Complacency       Resentments       Stress         "

## 2021-06-09 NOTE — PROGRESS NOTES
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 (304.10)  [] (F13.20) (304.10)   []  Stimulant Related Disorders [] Mild  [] Moderate  [] Severe [] (F15.10) (305.70) Amphetamine type substance  [] (F14.10) (305.60) Cocaine  [] (F15.10) (305.70) Other or unspecified stimulant  [] (F15.20) (304.40) Amphetamine type substance  [] (F14.20) (304.20) Cocaine  [] (F15.20) (304.40) Other or unspecified stimulant  [] (F15.20) (304.40) Amphetamine type substance  [] (F14.20) (304.20) Cocaine  [] (F15.20) (304.40) Other or unspecified stimulant   []  Tobacco use Disorder [] Mild  [] Moderate  [] Severe [] (Z72.0) (305.1)  [] (F17.200) (305.1)  [] (F17.200) (305.1)   []  Other (or unknown) Substance Use Disorder [] Mild  [] Moderate  [] Severe [] (F19.10) (305.90)  [] (F19.20) (304.90)  [] (F19.20) (304.90)       Suggested Level of Care Necessary for Recovery  []  Inpatient  []  Extended Care []  Residential [x]  Outpatient  []  None            Collateral Contact Summary   Number of contacts made:  2  Contact with referring person:  yes     If court related records were reviewed, summarize here:       []   Information from collateral contacts supported/largely agreed with information from the client and associated risk ratings.   [x]   Information from collateral contacts  was significantly different from information from the client and lead to different risk ratings.      Summarize new information here:   Collateral indicates more recent use and more problems associated with use than what was reported by client.     Rule 25 Assessment Summary and Plan   's Recommendation    Client assessed as meeting criteria for Alcohol Use Disorder, Moderate (F10.20).   Client is recommended to outpatient CD treatment.         Collateral Contacts     Please duplicate this page for each contact.  If this includes information which is sensitive and not public, separate this page from the rest of the assessment before sharing.  Retain the page in the assessment file.   Name    Tyrone Arreguin  [facility]    [phone] Releases    yes       Information Provided:      02/02/17 came into office, restructured release due to DWI on 1/21/17, recommendation for Rule 25.   No other signs of chemical use during supervision since 11/2016.   Unsure if client has been drinking more often, condition of release of no use.   3 UAs, all negative.       Collateral Contacts     Name    Ju Guzmán   Relationship    sister Phone Number    [phone] Releases    yes       Information Provided:      Reports is not around brother as often.   Believes client should ease up on drinking, tends to get into fights, doesn't seem to tell he's in a bad situation.   Unsure of frequency of amount of drinking, but will tend to get drunk when he drinks.         Staff Name and Title:  AGUSTO Mata     Date:  3/2/2017  Time:  2:07 PM

## 2021-06-09 NOTE — PROGRESS NOTES
Morales Velásquez attended 2 hours of group therapy today.  Morales was introduced to the group members today.      3/14/2017 2:20 PM Gina Galindo

## 2021-06-10 NOTE — PROGRESS NOTES
Breathalyser:     The patient was given a breathalyzer test today, he read at .000 there are no concerns at this time.     [facility]  4/20/2017

## 2021-06-10 NOTE — PROGRESS NOTES
Morales Velásquez attended 2 hours of group therapy today.      5/18/2017 2:52 PM Gina Galindo   irritable/bouts of crying

## 2021-06-10 NOTE — PROGRESS NOTES
"Weekly Progress Note  Morales Velásquez  1980  [id number]      D) Pt attended 1 groups  this week with 1 absences. A) Staff facilitated groups and reviewed tx progress. [facility] for [facility]. R) No VAP needed at this time. Pt working on the following dimensions:  Dimension #1 - Withdrawal Potential - No concern. Level 0 Client reports weekly use of alcohol which resulted in a DUI charge. Client reports last date of use being 1/22/17. The patient is encouraged to maintain abstinence (TXPlan 1) The patient has not reported any use in the last 7 days however his UA results were not congruent with that.  The patient's UA results on 5/18/17 were positive for alcohol.  The patient did not report any withdrawal symptoms nor did he appear intoxicated.    Dimension #2 - Biomedical - No concern.-Level 0 Client denies current medical issues. Client denies a current primary care clinic or physician. Client has health insurance and access to healthcare services.The patient is encouraged to develop a healthy lifestyle by implementing proper nutrition, exercise, and healthy sleeping habits (TXPlan 2). Prior to treatment the patient had a metal plate put in his jaw, he reports that the both of the screws have come out and he is still having concerns about this. The patient reports he has not taken care of the screws that have fallen out of his jaw.   Dimension #3 - Emotional , Behavioral and Cognitive - No concern. Level 0  Client denies mental health diagnosis or provider. Client denies history or current psychological significant stressors. Client denies history or abuse. Client denies history or current SI/SIB. The patient did not report any emergent emotional or behavioral concerns this week.   Dimension #4 - Treatment Resistance - No concern. Level 0 Client has external motivation for treatment services through probation. Client has been using alcohol and received a DUI while on probation. Client refers to self as a \"social\" " alcohol user however can see a pattern of how his use has caused significant problems in his life. Client reports willingness to participate in treatment services. The patient has been given his assignment packet with his use history (Complete) and written assignments(TXPlan 4).  Over the last week the patient has been late or absent to groups due to over sleeping, the patient will be put on a behavioral contract if he cannot get to groups on time. The patient did request more time in the program, he reports that he is starting to get it down and can see changes he just feels as though he needs more time.   Dimension #5 - Relapse Potential - Serious concern. Level 3 Client reports longest period of abstinence from substance use has been 3.5 years due to incarceration. Client has been through treatment in the past due to marijuana use. Client had continued to use alcohol despite probation. At this time the patient is at a moderately high vulnerability for use as seen in his most recent UA as it was positive.   Dimension #6 - Recovery Environment - Serious concern. Level 3 Client is unemployed with no form of income. Client has minimal daily structure. Client is currently residing with sister. The patient reports that he has support from his family and mainly his children.  He will be encouraged to develop daily structure that will include building sober support outside of his family, attending meetings (TXPlan 6). The patient is encouraged to attend at minimum 2 recovery meetings per week.  The patient reports that he would like to take care of his student loan debt, the patient was told that he needed to call the company and find out to get the loans into deferment rather than default.  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      T) Client educated on feelings. Client has completed 82 of 84 hours of program at this time. Projected  "discharge date is 6/13/17. Current discharge plan is relapse prevention group.     AGUSTO Ventura        Psycho-Educational Curriculum  Date Attended  Psycho-Educational Curriculum  Date Attended    Acceptance  4/20 Shame/Guilt     1st Step  4/18 Anger/Rage     Defense Mechanisms and Self Acceptance  4/17     DSM Criteria 4/19     Spirituality 4/19     Affirmations   Mental Health     Automatic Negative Thoughts   Anxiety     Cross Addiction   Co-Occurring Disorders     Stages of Change   Yany/Bipolar     Relapse   Trauma      Addictive Thoughts  4/26 Victim Identity     Relapse warning signs  4/24 Pleasure Unwoven  4/3   Reasons people fail in early recovery  4/25 Mental Health Research Project 4/4     No Kidding Me 2  4/5     Mental Health Jeopardy  4/6   Coping Skills   Sober Structure     Relapse Prevention   Continuum of Care     Medical Aspects  5/4 Non-12 Step Support     ACES Score 5/2 Balance 4/10     Maslow 4/12     Goals 4/13   Brain/Neurotransmitters  3/15 &5/1 Priorities  4/11   Medication Compliance   Spirituality  5/9   Alcohol/Drug Research project 3/14 Weekend Planner     Physical stress symptoms 3/13 Educational Videos     Domenic's story 3/13 1st Step     What drugs do in your body 3/16 2nd Step     Sexual Health & AIDS 5/3 Assertive Communication     Short-Term/Long-Term Effects   My name is J Carlos BERMUDEZ    Relationships  3/20 & 5/8 Cross Addiction     Assertive Communication  5/10 God As We Understood Him     Attitude 5/9     Healthy Relationships  5/11     Boundaries  3/22 HBO Relapse     Codependence    HBO What Is Addiction     Defense Mechanisms    Medical Aspects 1     Family Roles  3/22 Medical Aspects 2     Goodbye Letter   [employer]: Stress     Listening Skills  3/21     Intimacy   PBS Depression Out of the Shadows     Needs/Dealbreakers in Relationships  3/23 The Anonymous People 4/27   Socialization Skills   [place]     Feelings  3/28 Andriy Loco \"Highjacked Brain\"  " "  Anger 3/27     Emotional Regualtion 3/29     What is in your bag 3/30     ABC Model of Emotion   Pritesh Harvey Humor in [place]    Grief and Loss   The Mindfulness Movie    Healthy vs. Unhealthy Feelings   J Carlos BERMUDEZ documentary     Meditation/Mindfulness       Overconfidence/Complacency       Resentments       Primary Emotions 5/15     ABC's of CBT 5/17     \"Be This\" 5/18     Stress  5/16       "

## 2021-06-10 NOTE — PROGRESS NOTES
"Weekly Progress Note  Morales Velásquez  1980  [id number]      D) Pt attended 3 groups  this week with 1 absences. A) Staff facilitated groups and reviewed tx progress. Assessed for [facility]. R) No VAP needed at this time. Pt working on the following dimensions:  Dimension #1 - Withdrawal Potential - No concern. Level 0 Client reports weekly use of alcohol which resulted in a DUI charge. Client reports last date of use being 1/22/17. Client denies current withdrawal issues. The patient is encouraged to maintain abstinence (TXPlan 1) The patient has not reported any use in the last 7 days.   Dimension #2 - Biomedical - No concern.-Level 0 Client denies current medical issues. Client denies a current primary care clinic or physician. Client has health insurance and access to healthcare services.The patient is encouraged to develop a healthy lifestyle by implementing proper nutrition, exercise, and healthy sleeping habits (TXPlan 2). Prior to treatment the patient had a metal plate put in his jaw, he reports that the screw came out and he is still having concerns about this.  He reports he had the surgery done at [facility] and that he is able to set up an appointment for this procedure.  The patient is encouraged to deal with this matter in a prompt manner so he is no longer in pain.    Dimension #3 - Emotional , Behavioral and Cognitive - No concern. Level 0  Client denies mental health diagnosis or provider. Client denies history or current psychological significant stressors. Client denies history or abuse. Client denies history or current SI/SIB. The patient will be encouraged to look at mental health and manage it effectively (TXplan 3).   Dimension #4 - Treatment Resistance - Moderate concern. Level 2 Client has external motivation for treatment services through probation. Client has been using alcohol and received a DUI while on probation. Client refers to self as a \"social\" alcohol user. Client reports " willingness to participate in treatment services. The patient will be given his assignment packet with his use history and written assignments next week (TXPlan 4). The patient has been late to group almost every day this week, he was given notice that if he does not show up on time he will be put on a behavioral contract.  The patient did miss group 1x this week as a no call no show.  The patient has been on time and in groups other than the 1 day this week.  There has been a conversation with the patient that he needs to be in group when schedule and that he cannot miss groups.  He was very receptive to that and reports he will make a larger effort to be here. The patient reports he is working on his use history at this time.   Dimension #5 - Relapse Potential - Moderate concern. Level 2 Client reports longest period of abstinence from substance use has been 3.5 years due to incarceration. Client has been through treatment in the past due to marijuana use. Client had continued to use alcohol despite probation. Client's last date of use has been challenged from collateral on original assessment. At this time the patient is at a moderately high vulnerability for use.   The patient reports that he was having some frustrations with his children and that caused some urges, he reports that he just took a second to himself and breathing and that the urge passed.    Dimension #6 - Recovery Environment - Serious concern. Level 3 Client is unemployed with no form of income. Client has no current daily structure. Client is currently residing with sister. The patient reports that he has support from his family and mainly his children.  He will be encouraged to develop daily structure that will include building sober support outside of his family, attending meetings (TXPlan 6). The patient is encouraged to attend at minimum 2 recovery meetings per week.  The patient reports that he would like to take care of his student loan  debt, the patient was told that he needed to call the company and find out to get the loans into deferment rather than default.  The patient will be doing Easter with his children for his recovery, he reports that his family supports him and being with them is a good way to keep him sober.   T) Client educated on sober structure. Client has completed 48 of 84 hours of program at this time. Projected discharge date is 6/13/17. Current discharge plan is relapse prevention group.     AGUSTO Ventura        Psycho-Educational Curriculum  Date Attended  Psycho-Educational Curriculum  Date Attended    Acceptance   Shame/Guilt     1st Step   Anger/Rage     Affirmations   Mental Health     Automatic Negative Thoughts   Anxiety     Cross Addiction   Co-Occurring Disorders     Stages of Change   Yany/Bipolar     Relapse   Trauma      Addictive Thoughts   Victim Identity       Pleasure Unwoven  4/3     Mental Health Research Project 4/4     No Kidding Me 2  4/5     Mental Health Jeopardy  4/6   Coping Skills   Sober Structure     Relapse Prevention   Continuum of Care     Medical Aspects   Non-12 Step Support       Balance 4/10     Maslow 4/12     Goals 4/13   Brain/Neurotransmitters  3/15 Priorities  4/11   Medication Compliance   Spirituality     Alcohol/Drug Research project 3/14 Weekend Planner     Physical stress symptoms 3/13 Educational Videos     Domenic's story 3/13 1st Step     What drugs do in your body 3/16 2nd Step     Sexual Health   Assertive Communication     Short-Term/Long-Term Effects   My name is J Carlos Sanon  3/20 Cross Addiction     Assertive Communication   God As We Understood Him     Boundaries  3/22 HBO Relapse     Codependence    HBO What Is Addiction     Defense Mechanisms    Medical Aspects 1     Family Roles  3/22 Medical Aspects 2     Goodbye Letter   [employer] Geographic: Stress     Listening Skills  3/21     Intimacy   PBS Depression Out of the Shadows     Needs/Dealbreakers  "in Relationships  3/23 The Anonymous People    Socialization Skills   [place]     Feelings  3/28 Andriy Loco \"Highjacked Brain\"    Anger 3/27     Emotional Regualtion 3/29     What is in your bag 3/30     ABC Model of Emotion   Pritesh Harvey Humor in [place]    Grief and Loss   The Mindfulness Movie    Healthy vs. Unhealthy Feelings   J Carlos BERMUDEZ documentary     Meditation/Mindfulness       Overconfidence/Complacency       Resentments       Stress         "

## 2021-06-10 NOTE — PROGRESS NOTES
"Weekly Progress Note  Morales Velásquez  1980  [id number]      D) Pt attended 4 groups  this week with 0 absences. A) Staff facilitated groups and reviewed tx progress. Assessed for [facility]. R) No VAP needed at this time. Pt working on the following dimensions:  Dimension #1 - Withdrawal Potential - No concern. Level 0 Client reports weekly use of alcohol which resulted in a DUI charge. Client reports last date of use being 1/22/17. Client denies current withdrawal issues. The patient is encouraged to maintain abstinence (TXPlan 1) The patient has not reported any use in the last 7 days.   Dimension #2 - Biomedical - No concern.-Level 0 Client denies current medical issues. Client denies a current primary care clinic or physician. Client has health insurance and access to healthcare services.The patient is encouraged to develop a healthy lifestyle by implementing proper nutrition, exercise, and healthy sleeping habits (TXPlan 2). Prior to treatment the patient had a metal plate put in his jaw, he reports that the both of the screws have come out and he is still having concerns about this.  He reports he had the surgery done at [facility] and that he is able to set up an appointment for this procedure.  The patient is encouraged to deal with this matter in a prompt manner so he is no longer in pain.    Dimension #3 - Emotional , Behavioral and Cognitive - No concern. Level 0  Client denies mental health diagnosis or provider. Client denies history or current psychological significant stressors. Client denies history or abuse. Client denies history or current SI/SIB. The patient will be encouraged to look at mental health and manage it effectively (TXplan 3).   Dimension #4 - Treatment Resistance - Moderate concern. Level 2 Client has external motivation for treatment services through probation. Client has been using alcohol and received a DUI while on probation. Client refers to self as a \"social\" alcohol user. " Client reports willingness to participate in treatment services. The patient will be given his assignment packet with his use history and written assignments next week (TXPlan 4).  The patient has been on time and in groups every day. The patient reports he is working on his use history at this time which is due on 5/4/17.   Dimension #5 - Relapse Potential - Moderate concern. Level 2 Client reports longest period of abstinence from substance use has been 3.5 years due to incarceration. Client has been through treatment in the past due to marijuana use. Client had continued to use alcohol despite probation. Client's last date of use has been challenged from collateral on original assessment. At this time the patient is at a moderately high vulnerability for use. The patient did not express any urges to use this week nor has he reported any relapses.    Dimension #6 - Recovery Environment - Serious concern. Level 3 Client is unemployed with no form of income. Client has no current daily structure. Client is currently residing with sister. The patient reports that he has support from his family and mainly his children.  He will be encouraged to develop daily structure that will include building sober support outside of his family, attending meetings (TXPlan 6). The patient is encouraged to attend at minimum 2 recovery meetings per week.  The patient reports that he would like to take care of his student loan debt, the patient was told that he needed to call the company and find out to get the loans into deferment rather than default.  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    T) Client educated on relapse prevention. Client has completed 71 of 84 hours of program at this time. Projected discharge date is 6/13/17. Current discharge plan is relapse prevention group.     Gina Galindo [facility]        Psycho-Educational Curriculum   "Date Attended  Psycho-Educational Curriculum  Date Attended    Acceptance  4/20 Shame/Guilt     1st Step  4/18 Anger/Rage     Defense Mechanisms and Self Acceptance  4/17     DSM Criteria 4/19     Spirituality 4/19     Affirmations   Mental Health     Automatic Negative Thoughts   Anxiety     Cross Addiction   Co-Occurring Disorders     Stages of Change   Yany/Bipolar     Relapse   Trauma      Addictive Thoughts  4/26 Victim Identity     Relapse warning signs  4/24 Pleasure Unwoven  4/3   Reasons people fail in early recovery  4/25 Mental Health Research Project 4/4     No Kidding Me 2  4/5     Mental Health Jeopardy  4/6   Coping Skills   Sober Structure     Relapse Prevention   Continuum of Care     Medical Aspects   Non-12 Step Support       Balance 4/10     Maslow 4/12     Goals 4/13   Brain/Neurotransmitters  3/15 Priorities  4/11   Medication Compliance   Spirituality     Alcohol/Drug Research project 3/14 Weekend Planner     Physical stress symptoms 3/13 Educational Videos     Domenic's story 3/13 1st Step     What drugs do in your body 3/16 2nd Step     Sexual Health   Assertive Communication     Short-Term/Long-Term Effects   My name is J Carlos BERMUDEZ    Relationships  3/20 Cross Addiction     Assertive Communication   God As We Understood Him     Boundaries  3/22 HBO Relapse     Codependence    HBO What Is Addiction     Defense Mechanisms    Medical Aspects 1     Family Roles  3/22 Medical Aspects 2     Goodbye Letter   [employer]ographic: Stress     Listening Skills  3/21     Intimacy   PBS Depression Out of the Shadows     Needs/Dealbreakers in Relationships  3/23 The Anonymous People 4/27   Socialization Skills   [place]     Feelings  3/28 Andriy Loco \"Highjacked Brain\"    Anger 3/27     Emotional Regualtion 3/29     What is in your bag 3/30     ABC Model of Emotion   Pritesh Harvey Humor in [place]    Grief and Loss   The Mindfulness Movie    Healthy vs. Unhealthy Feelings   J Carlos BERMUDEZ documentary   "   Meditation/Mindfulness       Overconfidence/Complacency       Resentments       Stress

## 2021-06-10 NOTE — PROGRESS NOTES
Weekly Progress Note  Morales Velásquez  1980  [id number]      D) Pt attended 2 groups  this week with 0 absences. A) Staff facilitated groups and reviewed tx progress. Assessed for [facility]. R) No VAP needed at this time. Pt working on the following dimensions:  Dimension #1 - Withdrawal Potential - No concern. Level 0 Client reports weekly use of alcohol which resulted in a DUI charge. Client reports last date of use being 1/22/17. The patient is encouraged to maintain abstinence (TXPlan 1) The patient has not reported any use in the last 7 days. At this time the patient does not endorse any withdrawal symptoms.   Dimension #2 - Biomedical - No concern.-Level 0 Client denies current medical issues. Client denies a current primary care clinic or physician. Client has health insurance and access to healthcare services.The patient is encouraged to develop a healthy lifestyle by implementing proper nutrition, exercise, and healthy sleeping habits (TXPlan 2). Prior to treatment the patient had a metal plate put in his jaw, he reports that the both of the screws have come out and he is still having concerns about this.  He reports he had the surgery done at [facility] and that he is able to set up an appointment for this procedure.  The patient is encouraged to deal with this matter in a prompt manner so he is no longer in pain.  The patient reports he has still not dealt with this manner.   Dimension #3 - Emotional , Behavioral and Cognitive - No concern. Level 0  Client denies mental health diagnosis or provider. Client denies history or current psychological significant stressors. Client denies history or abuse. Client denies history or current SI/SIB. The patient reports that he has been stressed due to his car not working at this time.   Dimension #4 - Treatment Resistance - No concern. Level 0 Client has external motivation for treatment services through probation. Client has been using alcohol and received a  "DUI while on probation. Client refers to self as a \"social\" alcohol user however can see a pattern of how his use has caused significant problems in his life. Client reports willingness to participate in treatment services. The patient has been given his assignment packet with his use history (Complete) and written assignments(TXPlan 4).  The patient has been on time and in groups every day. The patient reports he is working on his goodbye letter at this time. The patient did request more time in the program, he reports that he is starting to get it down and can see changes he just feels as though he needs more time.  This request was taken into consideration and the patient will be given more time.    Dimension #5 - Relapse Potential - Moderate concern. Level 2 Client reports longest period of abstinence from substance use has been 3.5 years due to incarceration. Client has been through treatment in the past due to marijuana use. Client had continued to use alcohol despite probation. At this time the patient is at a moderately high vulnerability for use. The patient did not express any urges to use this week nor has he reported any relapses.    Dimension #6 - Recovery Environment - Serious concern. Level 3 Client is unemployed with no form of income. Client has minimal daily structure. Client is currently residing with sister. The patient reports that he has support from his family and mainly his children.  He will be encouraged to develop daily structure that will include building sober support outside of his family, attending meetings (TXPlan 6). The patient is encouraged to attend at minimum 2 recovery meetings per week.  The patient reports that he would like to take care of his student loan debt, the patient was told that he needed to call the company and find out to get the loans into deferment rather than default.  The patient reports he volunteered at a woman [employer] event and reports feeling good " about uplifting women around him.      T) Client educated on relationships. Client has completed 80 of 84 hours of program at this time. Projected discharge date is 6/13/17. Current discharge plan is relapse prevention group.     AGUSTO Ventura        Psycho-Educational Curriculum  Date Attended  Psycho-Educational Curriculum  Date Attended    Acceptance  4/20 Shame/Guilt     1st Step  4/18 Anger/Rage     Defense Mechanisms and Self Acceptance  4/17     DSM Criteria 4/19     Spirituality 4/19     Affirmations   Mental Health     Automatic Negative Thoughts   Anxiety     Cross Addiction   Co-Occurring Disorders     Stages of Change   Yany/Bipolar     Relapse   Trauma      Addictive Thoughts  4/26 Victim Identity     Relapse warning signs  4/24 Pleasure Unwoven  4/3   Reasons people fail in early recovery  4/25 Mental Health Research Project 4/4     No Kidding Me 2  4/5     Mental Health Jeopardy  4/6   Coping Skills   Sober Structure     Relapse Prevention   Continuum of Care     Medical Aspects  5/4 Non-12 Step Support     ACES Score 5/2 Balance 4/10     Maslow 4/12     Goals 4/13   Brain/Neurotransmitters  3/15 &5/1 Priorities  4/11   Medication Compliance   Spirituality  5/9   Alcohol/Drug Research project 3/14 Weekend Planner     Physical stress symptoms 3/13 Educational Videos     Domenic's story 3/13 1st Step     What drugs do in your body 3/16 2nd Step     Sexual Health & AIDS 5/3 Assertive Communication     Short-Term/Long-Term Effects   My name is J Carlos BERMUDEZ    Relationships  3/20 & 5/8 Cross Addiction     Assertive Communication  5/10 God As We Understood Him     Attitude 5/9     Healthy Relationships  5/11     Boundaries  3/22 HBO Relapse     Codependence    HBO What Is Addiction     Defense Mechanisms    Medical Aspects 1     Family Roles  3/22 Medical Aspects 2     Goodbye Letter   [employer] Geographic: Stress     Listening Skills  3/21     Intimacy   PBS Depression Out of the Shadows    "  Needs/Dealbreakers in Relationships  3/23 The Anonymous People 4/27   Socialization Skills   [place]     Feelings  3/28 Andriy Loco \"Highjacked Brain\"    Anger 3/27     Emotional Regualtion 3/29     What is in your bag 3/30     ABC Model of Emotion   Pritesh Harvey Humor in [place]    Grief and Loss   The Mindfulness Movie    Healthy vs. Unhealthy Feelings   J Carlos BERMUDEZ documentary     Meditation/Mindfulness       Overconfidence/Complacency       Resentments       Stress         "

## 2021-06-10 NOTE — PROGRESS NOTES
Weekly Progress Note  Morales Velásquez  1980  [id number]      D) Pt attended 1 groups  this week with 1 absences. A) Staff facilitated groups and reviewed tx progress. Assessed for VA. R) No VAP needed at this time. Pt working on the following dimensions:  Dimension #1 - Withdrawal Potential - No concern. Level 0 Client reports weekly use of alcohol which resulted in a DUI charge. Client reports last date of use being 1/22/17. Client denies current withdrawal issues. The patient is encouraged to maintain abstinence (TXPlan 1) The patient has not reported any use in the last 7 days.   Dimension #2 - Biomedical - No concern.-Level 0 Client denies current medical issues. Client denies a current primary care clinic or physician. Client has health insurance and access to healthcare services.The patient is encouraged to develop a healthy lifestyle by implementing proper nutrition, exercise, and healthy sleeping habits (TXPlan 2). Prior to treatment the patient had a metal plate put in his jaw, he reports that the both of the screws have come out and he is still having concerns about this.  He reports he had the surgery done at [facility] and that he is able to set up an appointment for this procedure.  The patient is encouraged to deal with this matter in a prompt manner so he is no longer in pain.  The patient reports he has still not dealt with this manner.   Dimension #3 - Emotional , Behavioral and Cognitive - No concern. Level 0  Client denies mental health diagnosis or provider. Client denies history or current psychological significant stressors. Client denies history or abuse. Client denies history or current SI/SIB. The patient reports that he has been stressed due to his car not working at this time.   Dimension #4 - Treatment Resistance - Mild concern. Level 1 Client has external motivation for treatment services through probation. Client has been using alcohol and received a DUI while on probation.  "Client refers to self as a \"social\" alcohol user. Client reports willingness to participate in treatment services. The patient will be given his assignment packet with his use history (Complete) and written assignments next week (TXPlan 4).  The patient has been on time and in groups every day. The patient reports he is working on his goodbye letter at this time.   Dimension #5 - Relapse Potential - Moderate concern. Level 2 Client reports longest period of abstinence from substance use has been 3.5 years due to incarceration. Client has been through treatment in the past due to marijuana use. Client had continued to use alcohol despite probation. Client's last date of use has been challenged from collateral on original assessment. At this time the patient is at a moderately high vulnerability for use. The patient did not express any urges to use this week nor has he reported any relapses.    Dimension #6 - Recovery Environment - Serious concern. Level 3 Client is unemployed with no form of income. Client has no current daily structure. Client is currently residing with sister. The patient reports that he has support from his family and mainly his children.  He will be encouraged to develop daily structure that will include building sober support outside of his family, attending meetings (TXPlan 6). The patient is encouraged to attend at minimum 2 recovery meetings per week.  The patient reports that he would like to take care of his student loan debt, the patient was told that he needed to call the company and find out to get the loans into deferment rather than default.  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    T) Client educated on medical aspects. Client has completed 74 of 84 hours of program at this time. Projected discharge date is 6/13/17. Current discharge plan is relapse prevention group.     Gina Galindo " "[facility]        Psycho-Educational Curriculum  Date Attended  Psycho-Educational Curriculum  Date Attended    Acceptance  4/20 Shame/Guilt     1st Step  4/18 Anger/Rage     Defense Mechanisms and Self Acceptance  4/17     DSM Criteria 4/19     Spirituality 4/19     Affirmations   Mental Health     Automatic Negative Thoughts   Anxiety     Cross Addiction   Co-Occurring Disorders     Stages of Change   Yany/Bipolar     Relapse   Trauma      Addictive Thoughts  4/26 Victim Identity     Relapse warning signs  4/24 Pleasure Unwoven  4/3   Reasons people fail in early recovery  4/25 Mental Health Research Project 4/4     No Kidding Me 2  4/5     Mental Health Jeopardy  4/6   Coping Skills   Sober Structure     Relapse Prevention   Continuum of Care     Medical Aspects  5/4 Non-12 Step Support     ACES Score 5/2 Balance 4/10     Maslow 4/12     Goals 4/13   Brain/Neurotransmitters  3/15 &5/1 Priorities  4/11   Medication Compliance   Spirituality     Alcohol/Drug Research project 3/14 Weekend Planner     Physical stress symptoms 3/13 Educational Videos     Domenic's story 3/13 1st Step     What drugs do in your body 3/16 2nd Step     Sexual Health & AIDS 5/3 Assertive Communication     Short-Term/Long-Term Effects   My name is J Carlos BERMUDEZ    Relationships  3/20 Cross Addiction     Assertive Communication   God As We Understood Him     Boundaries  3/22 HBO Relapse     Codependence    HBO What Is Addiction     Defense Mechanisms    Medical Aspects 1     Family Roles  3/22 Medical Aspects 2     Goodbye Letter   National Geographic: Stress     Listening Skills  3/21     Intimacy   PBS Depression Out of the Shadows     Needs/Dealbreakers in Relationships  3/23 The Anonymous People 4/27   Socialization Skills   [place]     Feelings  3/28 Andriy Loco \"Highjacked Brain\"    Anger 3/27     Emotional Regualtion 3/29     What is in your bag 3/30     ABC Model of Emotion   Pritesh Harvey Humor in [place]    Grief and Loss   The Mindfulness Movie  "   Healthy vs. Unhealthy Feelings   J Carlos BERMUDEZ documentary     Meditation/Mindfulness       Overconfidence/Complacency       Resentments       Stress

## 2021-06-10 NOTE — PROGRESS NOTES
Weekly Progress Note  Morales Velásquez  1980  [id number]      D) Pt attended 4 groups  this week with 0 absences. A) Staff facilitated groups and reviewed tx progress. Assessed for [facility]. R) No VAP needed at this time. Pt working on the following dimensions:  Dimension #1 - Withdrawal Potential - No concern. Level 0 Client reports weekly use of alcohol which resulted in a DUI charge. Client reports last date of use being 1/22/17. Client denies current withdrawal issues. The patient is encouraged to maintain abstinence (TXPlan 1) The patient has not reported any use in the last 7 days. The patient was given a breathalyzer on 4/20/17 and it read .000.   Dimension #2 - Biomedical - No concern.-Level 0 Client denies current medical issues. Client denies a current primary care clinic or physician. Client has health insurance and access to healthcare services.The patient is encouraged to develop a healthy lifestyle by implementing proper nutrition, exercise, and healthy sleeping habits (TXPlan 2). Prior to treatment the patient had a metal plate put in his jaw, he reports that the screw came out and he is still having concerns about this.  He reports he had the surgery done at [facility] and that he is able to set up an appointment for this procedure.  The patient is encouraged to deal with this matter in a prompt manner so he is no longer in pain.    Dimension #3 - Emotional , Behavioral and Cognitive - No concern. Level 0  Client denies mental health diagnosis or provider. Client denies history or current psychological significant stressors. Client denies history or abuse. Client denies history or current SI/SIB. The patient will be encouraged to look at mental health and manage it effectively (TXplan 3).   Dimension #4 - Treatment Resistance - Moderate concern. Level 2 Client has external motivation for treatment services through probation. Client has been using alcohol and received a DUI while on probation.  "Client refers to self as a \"social\" alcohol user. Client reports willingness to participate in treatment services. The patient will be given his assignment packet with his use history and written assignments next week (TXPlan 4).  The patient has been on time and in groups every day. The patient reports he is working on his use history at this time which is due on 5/4/17.   Dimension #5 - Relapse Potential - Moderate concern. Level 2 Client reports longest period of abstinence from substance use has been 3.5 years due to incarceration. Client has been through treatment in the past due to marijuana use. Client had continued to use alcohol despite probation. Client's last date of use has been challenged from collateral on original assessment. At this time the patient is at a moderately high vulnerability for use. The patient did not express any urges to use this week nor has he reported any relapses.    Dimension #6 - Recovery Environment - Serious concern. Level 3 Client is unemployed with no form of income. Client has no current daily structure. Client is currently residing with sister. The patient reports that he has support from his family and mainly his children.  He will be encouraged to develop daily structure that will include building sober support outside of his family, attending meetings (TXPlan 6). The patient is encouraged to attend at minimum 2 recovery meetings per week.  The patient reports that he would like to take care of his student loan debt, the patient was told that he needed to call the company and find out to get the loans into deferment rather than default.    T) Client educated on acceptance. Client has completed 60 of 84 hours of program at this time. Projected discharge date is 6/13/17. Current discharge plan is relapse prevention group.     AGUSTO Ventura        Psycho-Educational Curriculum  Date Attended  Psycho-Educational Curriculum  Date Attended    Acceptance  4/20 " "Shame/Guilt     1st Step  4/18 Anger/Rage     Defense Mechanisms and Self Acceptance  4/17     DSM Criteria 4/19     Spirituality 4/19     Affirmations   Mental Health     Automatic Negative Thoughts   Anxiety     Cross Addiction   Co-Occurring Disorders     Stages of Change   Yany/Bipolar     Relapse   Trauma      Addictive Thoughts   Victim Identity       Pleasure Unwoven  4/3     Mental Health Research Project 4/4     No Kidding Me 2  4/5     Mental Health Jeopardy  4/6   Coping Skills   Sober Structure     Relapse Prevention   Continuum of Care     Medical Aspects   Non-12 Step Support       Balance 4/10     Maslow 4/12     Goals 4/13   Brain/Neurotransmitters  3/15 Priorities  4/11   Medication Compliance   Spirituality     Alcohol/Drug Research project 3/14 Weekend Planner     Physical stress symptoms 3/13 Educational Videos     Domenic's story 3/13 1st Step     What drugs do in your body 3/16 2nd Step     Sexual Health   Assertive Communication     Short-Term/Long-Term Effects   My name is J Carlos BERMUDEZ    Relationships  3/20 Cross Addiction     Assertive Communication   God As We Understood Him     Boundaries  3/22 HBO Relapse     Codependence    HBO What Is Addiction     Defense Mechanisms    Medical Aspects 1     Family Roles  3/22 Medical Aspects 2     Goodbye Letter   National Geographic: Stress     Listening Skills  3/21     Intimacy   PBS Depression Out of the Shadows     Needs/Dealbreakers in Relationships  3/23 The Anonymous People    Socialization Skills   [place]     Feelings  3/28 Andriy Loco \"Highjacked Brain\"    Anger 3/27     Emotional Regualtion 3/29     What is in your bag 3/30     ABC Model of Emotion   Pritesh Harvey Humor in [place]    Grief and Loss   The Mindfulness Movie    Healthy vs. Unhealthy Feelings   J Carlos BERMUDEZ documentary     Meditation/Mindfulness       Overconfidence/Complacency       Resentments       Stress         "

## 2021-06-11 NOTE — PROGRESS NOTES
Patient Note    The patient reported today that he has been in [facility] for the last 2 weeks (this writer knew as I had spoke to his ).  The patient reports that while in [facility] he witnessed another inmate commit suicide by jumping off of the 10th tier.  The patient reports that he has been replaying the scene in his head over the last week.  The patient reported he had talked to a psychiatrist while in [facility] however feels as though he needs to continue seeing someone for this.  The patient was scheduled with Will Fuentes in the [facility].  The patient was also given information for the mental health crisis center should he need to see someone sooner.  The patient was stable at the time of the interaction, no SI/SIB.     AGUSTO Ventura 7/3/2017

## 2021-06-11 NOTE — PROGRESS NOTES
Weekly Progress Note  Morales Velásquez  1980  [id number]      D) Pt attended 1 groups  this week with 0 absences. A) Staff facilitated groups and reviewed tx progress. Assessed for VA. R) No VAP needed at this time. Pt working on the following dimensions:    Dimension #1 - Withdrawal Potential - Risk 0, no concern. Pt reported weekly use of alcohol which resulted in a DUI charge.  The patient's UA results on 5/18/17 were positive for alcohol, he did report that he drank due to being stressed. However, Pt continues to report last use as 1/22/17.   Dimension #2 - Biomedical - Risk 0, no concern. Pt neither reports nor presents with biomedical concerns at this time.   Dimension #3 - Emotional/Behavioral/Cognitive - Risk 1, mild concern. Pt denies mental health diagnosis or provider. The [facility] where he witnessed another inmate commit suicide.  He did talk to a therapist while in long term, however feels as though he needs to seek more mental health attention.  He has a DA scheduled with Will Colby in the [facility] on 7/11/17. .  Dimension #4 - Treatment Acceptance/Resistance - Risk 1, mild concern. Pt reports a desire to live a sober lifestyle and meet treatment expectations. Pt reports he is primarily motivated by probationary obligations and feels he has been a social drinker. Pt does appear to have made progress in IOP.   Dimension #5 - Relapse Potential - Risk 3, serious concern. Pt remains highly vulnerable to relapse due to history or return to use and coping skills appear minimal.   Dimension #6 - Recovery Environment - Risk 3, serious concern. Pt reports he lives with his sister and has supportive family and friends. The patient is involved in many [employer] organizations in the community in which he volunteers with on a regular basis.  Pt reports he is currently unemployed, but actively seeking a job. Pt reports probationary obligations with [facility].  "    T) Patient educated on Finding employment. Patient has completed 98 hours of IOP. Pt has completed 2 hours of RP.  Projected discharge date is 9/29/17. Current discharge plan is TBD.     Donald Welander, LADC        Psycho-Educational Curriculum  Date Attended  Psycho-Educational Curriculum  Date Attended    Acceptance   Shame/Guilt     1st Step   Anger/Rage     Affirmations   Mental Health     Automatic Negative Thoughts   Anxiety     Cross Addiction   Co-Occurring Disorders     Stages of Change   Yany/Bipolar     Relapse   Trauma      Addictive Thoughts   Victim Identity     Coping Skills   Sober Structure     Relapse Prevention   Continuum of Care     Medical Aspects   Non-12 Step Support     Brain/Neurotransmitters   Priorities     Medication Compliance   Spirituality     NIMISHA Alcohol/Drug Research   Weekend Planner     Physical Health   Educational Videos     Post Acute Withdrawal   1st Step     Pregnancy and Drug Use   2nd Step     Sexual Health   Assertive Communication     Short-Term/Long-Term Effects   My name is J Carlos BERMUDEZ    Relationships   Cross Addiction     Assertive Communication   God As We Understood Him     Boundaries   HBO Relapse     Codependence    HBO What Is Addiction     Defense Mechanisms    Medical Aspects 1     Family Roles   Medical Aspects 2     Goodbye Letter   National Geographic: Stress     Intimacy   PBS Depression Out of the Shadows     Needs/Dealbreakers in Relationships   The Anonymous People    Socialization Skills   [place]     Feelings   Andriy Loco \"Highjacked Brain\"    ABC Model of Emotion   Pritesh Harvey Humor in [place]    Grief and Loss   The Mindfulness Movie    Healthy vs. Unhealthy Feelings   J Carlos BERMUDEZ documentary     Meditation/Mindfulness       Overconfidence/Complacency       Resentments       Stress         "
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
## 2021-06-11 NOTE — PROGRESS NOTES
[facility]   Mental Health and Addiction Care   [facility], [facility], and [facility]    [phone] or [phone]   Relapse Prevention Plan     Client Name:  Morales Velásquez   MRN: [id number]    Counselor: Donald Welander, LADC       Title:  Dimension 1 Withdrawal Potential, Risk level 0, no concern   Plan Date:   7/13/2017   Diagnosis:  Moderate Alcohol Use Disorder    Problem:Pt reported weekly use of alcohol which resulted in a DUI charge.  The patient's UA results on 5/18/17 were positive for alcohol, he did report that he drank due to being stressed. However, Pt continues to report last use as 1/22/17.     Goal: Begin Date: 7/6/17 Target Date: 10/13/17  Remain clean and sober throughout Relapse Prevention group in order to avoid experiencing withdrawal symptoms and to meet group expectations.     Method 1: Begin Date:7/6/17 Target Date: 10/13/17 Date Completed:   Maintain abstinence while attending Relapse Prevention as a way of gaining awareness of your thoughts, feelings and aspirations for recovery. Report any relapses, if any, on any substances of abuse to staff immediately.        Title:  Dimension 2 Biomedical condition, Risk level 0, no concern   Plan Date:   7/13/2017   Diagnosis:  Moderate Alcohol Use Disorder    Problem:Pt neither reports nor presents with biomedical concerns at this time.    Goal: Begin Date: 7/6/17 Target Date: 10/13/17  Continue to develop healthy habits that enhance your recovery lifestyle.     Method 1: Begin Date:7/6/17 Target Date: 10/13/17 Date Completed:   Get proper rest, nutrition, and exercise in order to promote good brain and body function. Inform staff immediately of any changes in your health that may affect your active participation in group therapy or attendance.    Is Nicotine use indicated on the assessment? no       Title: Dimension 3, Emotional, behavioral, cognitive condition, Risk level 1, mild concern   Plan  Date:   7/13/2017   Diagnosis:  Moderate Alcohol Use Disorder    Problem:Pt denies mental health diagnosis or provider. The patient was recently incarcerated in [facility] where he witnessed another inmate commit suicide.  He did talk to a therapist while in penitentiary, however feels as though he needs to seek more mental health attention.  Pt was scheduled for a DA with Will Colby in the [facility] on 7/11/17 and appears to have no showed.     Goal: Begin Date: 7/6/17 Target Date: 10/13/17   To treat mental health effectively while attending aftercare in order to increase your ability to meet goals and treatment expectations.   Method 1: Begin Date:7/6/17 Target Date: 10/13/17  Report to staff any changes in your mental health that may affect your attendance or participation in group therapy.   Date Completed:        Title: Dimension 4, Treatment Acceptance/Resistance, Risk level 1, mild concern   Plan Date:   7/13/2017   Diagnosis:  Moderate Alcohol Use Disorder    Problem:Pt reports a desire to live a sober lifestyle and meet treatment expectations. Pt reports he is primarily motivated by probationary obligations and feels he has been a social drinker. Pt does appear to have made progress in IOP.     Goal: Begin Date: 7/6/17 Target Date: 10/13/17  To follow through with intentions to treat chemical dependency concerns while meeting Relapse Prevention group expectations in order to graduate successfully from our program.     Method 1: Begin Date:7/6/17 Target Date: 10/13/17 Date Completed:   Attend Relapse Prevention groups as directed and share thoughts, feelings and urges to use, as well as sober supports with staff and peers in order to maintain awareness of details shaping your recovery process.       Title: Dimension 5, Relapse potential, Risk level 3, serious concern   Plan Date:   7/13/2017   Diagnosis:  Moderate Alcohol Use Disorder    Problem:Pt remains highly vulnerable to relapse due to history or  return to use and coping skills appear minimal.     Goal: Begin Date: 7/6/17Target Date: 10/13/17  To deal effectively with relapse triggers and stressors while building coping skills in order to handle life events without resorting to drug/alcohol use.     Method 1: Begin Date:7/6/17 Target Date: 10/13/17  Date Completed:   Share the sober living skills you have learned with other group members in order to help them in their recovery. Continue to improve upon your recovery skills and share how you are able to manage urges and triggers to return to use.        Title: Dimension 6, Recovery Environment, Risk level 3, serious concern   Plan Date:   7/13/2017   Diagnosis:  Moderate Alcohol Use Disorder    Problem:Pt reports he lives with his sister and has supportive family and friends. The patient is involved in many [employer] organizations in the community in which he volunteers with on a regular basis.  Pt reports he is currently unemployed, but actively seeking a job. Pt reports probationary obligations with [facility].     Goal: Begin Date: 7/6/17 Target Date: 10/13/17  To build meaningful structure into your weekly schedule by attending specific recovery activities on a daily basis     Method 1: Begin Date:7/6/17 Target Date: 10/13/17 Date Completed:   Find 2 sober support groups you feel comfortable attending on a weekly basis and inform counselor how these meetings are impacting you.   Method 2: Begin Date:7/6/17 Target Date: 10/13/17  Date Completed:   Find a sponsor that will enhance your recovery and help build your sober support network.       By signing this document, I am acknowledging that I was actively and directly involved in the development of my treatment plan. I have accepted my referral to the Relapse Prevention program and will attend  group sessions as directed by staff.         Client Signature_________________________________________         Date__________________         Staff Signature    Donald Welander, [facility],

## 2021-06-11 NOTE — PROGRESS NOTES
Morales Velásquez attended 3 hours of group therapy today. The patient graduated from group today.     7/3/2017 1:00 PM Gina Galindo

## 2021-06-11 NOTE — PROGRESS NOTES
1:1 Session     The patient and this writer for a 1 hour 1:1 session to discuss the patients treatment goals as well as to discuss his anticipated transfer date to relapse prevention.  The patient reports nouse since 4/17/17 in which he reportedly used alcohol to cope with life stressors.  The patient was able to learn from this relapse and was able to identify that he does not want to go to senior living over a couple drinks.  The patient reports no immediate biomedical concerns however at this time one screw in his jaw came out through his cheek and the other screw has been wiggling out.  The patient was encouraged to seek medical help for this situation.  The patient has been experiencing some stress and sadness due to his son's being out out of the state for summer vacation.  The patient reports that he is missing them immenceley however he is glad that they are having, he reports being able to talk to them about 2x per week which he finds uplifting. The patient has been applying for jobs at this time and has been trying to build sober structure.  The patient and this writer discussed transferring to [facility] on 6/22/2016, he is looking forward to moving on.  Patient did not report any thoughts of suicide or homicide.     AGUSTO Ventura  6/16/2017

## 2021-06-11 NOTE — PROGRESS NOTES
"Transfer Note to [facility]  Morales Velásquez  1980  [id number]      D) Pt attended 1 groups  this week with 0 absences. The patient is in phase II of DOP. A) Staff facilitated groups and reviewed tx progress. Assessed for [facility]. R) No VAP needed at this time. Pt working on the following dimensions:  Dimension #1 - Withdrawal Potential - No concern. Level 0 Client reported weekly use of alcohol which resulted in a DUI charge.  The patient's UA results on 5/18/17 were positive for alcohol, he did report that he drank due to being stressed. The patient did not report any use in the last 7 days nor does he endorse any withdrawal symptoms.   Dimension #2 - Biomedical - No concern.-Level 0 Client denies current medical issues. Client denies a current primary care clinic or physician. Client has health insurance and access to healthcare services. Prior to treatment the patient had a metal plate put in his jaw, he reports that the screws have come out.  The patient has been encouraged to seek medical attention for this however at this time he reports that he feels as though he doesn't need to.  Dimension #3 - Emotional , Behavioral and Cognitive - Minor concern. Level 1  Client denies mental health diagnosis or provider. The patient was recently incarcerated in [facility] where he witnessed another inmate commit suicide by jumping off of the top tier.  He did talk to a therapist while in residential however feels as though he needs to seek more mental health attention.  He has a DA scheduled with Will Colby in the [facility] on 7/11/17. The patient denies any SI/SIB.   Dimension #4 - Treatment Resistance - No. Level 0 Client has external motivation for treatment services through probation. Client has been using alcohol and received a DUI while on probation. Client refers to self as a \"social\" alcohol user however can see a pattern of how his use has caused significant problems in his life. The patient was given his treatment plan and " signed without any concerns.    Dimension #5 - Relapse Potential - Serious concern. Level 3 Client reports longest period of abstinence from substance use has been 3.5 years due to incarceration. Client has been through treatment in the past due to marijuana use. Client had continued to use alcohol despite probation. At this time the patient is at a high vulnerability for use as he has recently used alcohol as evidence by his use on 5/18/17.   Dimension #6 - Recovery Environment - Serious concern. Level 3 Client is currently residing with sister. The patient reports that he has support from his family and  his children.  The patient is involved in many [employer] organizations in the community in which he volunteers with on a regular basis.  The patient does have children which he sees regularly (oldest is out of town for the summer).  The patient has been working on finding employment, he has been putting in applications on a regular basis.    T) Client educated on Feelings. Client has completed 98 of 84 hours of program at this time. Transfer date is 7/5/17. Aftercare plan relapse prevention group.     AGUSTO Ventura        Psycho-Educational Curriculum  Date Attended  Psycho-Educational Curriculum  Date Attended    Acceptance  4/20 & 6/5 Shame/Guilt     1st Step  4/18 Anger/Rage     Defense Mechanisms and Self Acceptance  4/17     DSM Criteria 4/19     Spirituality 4/19     Affirmations   Mental Health     Cost of Addiction  6/6 Stages of grief 5/22   Defenses 6/7 Emotional intelligence  5/23   Coping Skills  6/8 You Be The Teacher 5/24     Mental Health Jeopardy  5/25   Automatic Negative Thoughts   Anxiety     Cross Addiction   Co-Occurring Disorders     Stages of Change   Yany/Bipolar     Relapse  6/13 Trauma      Addictive Thoughts  4/26 & 6/12 Victim Identity     Relapse warning signs  4/24 Pleasure Unwoven  4/3   Reasons people fail in early recovery  4/25 Mental Health Research Project 4/4  "    No Kidding Me 2  4/5   Self Sabotage 6/15 Mental Health Jeopardy  4/6   Coping Skills  6/14/17 Sober Structure  5/30   Relapse Prevention   Continuum of Care       Employment  6/1   Medical Aspects  5/4 Non-12 Step Support     ACES Score 5/2 Balance 4/10     Maslow 4/12 & 5/31     Goals 4/13   Brain/Neurotransmitters  3/15 &5/1 Priorities  4/11   Medication Compliance   Spirituality  5/9   Alcohol/Drug Research project 3/14 Weekend Planner     Physical stress symptoms 3/13 Educational Videos     Domenic's story 3/13 1st Step     What drugs do in your body 3/16 2nd Step     Sexual Health & AIDS 5/3 Assertive Communication     Short-Term/Long-Term Effects   My name is J Carlos BERMUDEZ    Relationships  3/20 & 5/8 Cross Addiction     Assertive Communication  5/10 God As We Understood Him     Attitude 5/9     Healthy Relationships  5/11     Boundaries  3/22 HBO Relapse     Codependence    HBO What Is Addiction     Defense Mechanisms    Medical Aspects 1     Family Roles  3/22 Medical Aspects 2     Goodbye Letter   [employer]ographic: Stress     Listening Skills  3/21     Intimacy   PBS Depression Out of the Shadows     Needs/Dealbreakers in Relationships  3/23 [employer] People 4/27   Socialization Skills   [place]     Feelings  3/28 Andriy Loco \"Highjacked Brain\"    Anger 3/27     Emotional Regualtion 3/29     What is in your bag 3/30     ABC Model of Emotion   Pritesh Harvey Humor in [place] 7/3   Grief and Loss   The Mindfulness Movie    Healthy vs. Unhealthy Feelings   J Carlos BERMUDEZ documentary     Meditation/Mindfulness       Overconfidence/Complacency       Resentments       Primary Emotions 5/15     ABC's of CBT 5/17     \"Be This\" 5/18     Stress  5/16       "

## 2021-06-11 NOTE — PROGRESS NOTES
Weekly Progress Note  Morales Velásquez  1980  [id number]      D) Pt attended 1 groups  this week with 0 absences. A) Staff facilitated groups and reviewed tx progress. Assessed for [facility]. R) No VAP needed at this time. Pt working on the following dimensions:  Dimension #1 - Withdrawal Potential - No concern. Level 0 Client reports weekly use of alcohol which resulted in a DUI charge. Upon entering treatment the client reports last date of use being 1/22/17. The patient is encouraged to maintain abstinence (TXPlan 1) The patient's UA results on 5/18/17 were positive for alcohol.  The patient did not report any use in the last 7 days.    Dimension #2 - Biomedical - No concern.-Level 0 Client denies current medical issues. Client denies a current primary care clinic or physician. Client has health insurance and access to healthcare services.The patient is encouraged to develop a healthy lifestyle by implementing proper nutrition, exercise, and healthy sleeping habits (TXPlan 2). Prior to treatment the patient had a metal plate put in his jaw, he reports that the both of the screws have come out and he is still having concerns about this. The patient reports he has not taken care of the screws that have fallen out of his jaw.   Dimension #3 - Emotional , Behavioral and Cognitive - No concern. Level 0  Client denies mental health diagnosis or provider. Client denies history or current psychological significant stressors. Client denies history or abuse. Client denies history or current SI/SIB. The patient did not report any emergent emotional or behavioral concerns this week. The patient appears to be under a significant amount of stress over the past few weeks, he has reported life stressors such as issues with his car and his son leaving for the summer.    Dimension #4 - Treatment Resistance - minor. Level 1 Client has external motivation for treatment services through probation. Client has been using alcohol and  "received a DUI while on probation. Client refers to self as a \"social\" alcohol user however can see a pattern of how his use has caused significant problems in his life. Client reports willingness to participate in treatment services. The patient has been given his assignment packet with his use history (Complete) and written assignments(TXPlan 4).  The patient was late for groups this week and struggled to stay off of his phone and awake while in group.  The patient also missed his scheduled 1:1 session.  The patient and this writer have a 1:1 scheduled for 6/7/17 where the expectations of the group will be reiterated.   Dimension #5 - Relapse Potential - Serious concern. Level 3 Client reports longest period of abstinence from substance use has been 3.5 years due to incarceration. Client has been through treatment in the past due to marijuana use. Client had continued to use alcohol despite probation. At this time the patient is at a high vulnerability for use as he has recently used alcohol and appears to lack coping skills to manage life stressors.    Dimension #6 - Recovery Environment - Serious concern. Level 3 Client is unemployed with no form of income. Client has minimal daily structure. Client is currently residing with sister. The patient reports that he has support from his family and mainly his children.  He will be encouraged to develop daily structure that will include building sober support outside of his family, attending meetings, and finding employment (TXPlan 6). The patient is encouraged to attend at minimum 2 recovery meetings per week and obtain written verification.  The patient reports that he would like to take care of his student loan debt, the patient was told that he needed to call the company and find out to get the loans into deferment rather than default.    T) Client educated on sober structure. Client has completed 88 of 84 hours of program at this time. Projected discharge date is " 6/22/17. Current discharge plan is relapse prevention group.     AGUSTO Ventura        Psycho-Educational Curriculum  Date Attended  Psycho-Educational Curriculum  Date Attended    Acceptance  4/20 Shame/Guilt     1st Step  4/18 Anger/Rage     Defense Mechanisms and Self Acceptance  4/17     DSM Criteria 4/19     Spirituality 4/19     Affirmations   Mental Health       Stages of grief 5/22     Emotional intelligence  5/23     You Be The Teacher 5/24     Mental Health Jeopardy  5/25   Automatic Negative Thoughts   Anxiety     Cross Addiction   Co-Occurring Disorders     Stages of Change   Yany/Bipolar     Relapse   Trauma      Addictive Thoughts  4/26 Victim Identity     Relapse warning signs  4/24 Pleasure Unwoven  4/3   Reasons people fail in early recovery  4/25 Mental Health Research Project 4/4     No Kidding Me 2  4/5     Mental Health Jeopardy  4/6   Coping Skills   Sober Structure  5/30   Relapse Prevention   Continuum of Care       Employment  6/1   Medical Aspects  5/4 Non-12 Step Support     ACES Score 5/2 Balance 4/10     Maslow 4/12 & 5/31     Goals 4/13   Brain/Neurotransmitters  3/15 &5/1 Priorities  4/11   Medication Compliance   Spirituality  5/9   Alcohol/Drug Research project 3/14 Weekend Planner     Physical stress symptoms 3/13 Educational Videos     Domenic's story 3/13 1st Step     What drugs do in your body 3/16 2nd Step     Sexual Health & AIDS 5/3 Assertive Communication     Short-Term/Long-Term Effects   My name is J Carlos BERMUDEZ    Relationships  3/20 & 5/8 Cross Addiction     Assertive Communication  5/10 God As We Understood Him     Attitude 5/9     Healthy Relationships  5/11     Boundaries  3/22 HBO Relapse     Codependence    HBO What Is Addiction     Defense Mechanisms    Medical Aspects 1     Family Roles  3/22 Medical Aspects 2     Goodbye Letter   [employer] Geographic: Stress     Listening Skills  3/21     Intimacy   PBS Depression Out of the Shadows     Needs/Dealbreakers in  "Relationships  3/23 The Anonymous People 4/27   Socialization Skills   [place]     Feelings  3/28 Andriy Loco \"Highjacked Brain\"    Anger 3/27     Emotional Regualtion 3/29     What is in your bag 3/30     ABC Model of Emotion   Pritesh Harvey Humor in [place]    Grief and Loss   The Mindfulness Movie    Healthy vs. Unhealthy Feelings   J Carlos BERMUDEZ documentary     Meditation/Mindfulness       Overconfidence/Complacency       Resentments       Primary Emotions 5/15     ABC's of CBT 5/17     \"Be This\" 5/18     Stress  5/16       "

## 2021-06-11 NOTE — PROGRESS NOTES
The patient is currently incarcerated in [facility].  He is being held on a probation violation and is expected to be released on 6/30/17.  At this time the patient will remain enrolled in the program.  The patient's treatment plan is out of date at this time, we will re-evaluate upon his release from assisted.      AGUSTO Ventura 6/19/2017

## 2021-06-11 NOTE — PROGRESS NOTES
Weekly Progress Note  Morales Velásquez  1980  [id number]      D) Pt attended 1 groups  this week with 1 absences. A) Staff facilitated groups and reviewed tx progress. Assessed for [facility]. R) No VAP needed at this time. Pt working on the following dimensions:  Dimension #1 - Withdrawal Potential - No concern. Level 0 Client reports weekly use of alcohol which resulted in a DUI charge. Upon entering treatment the client reports last date of use being 1/22/17. The patient is encouraged to maintain abstinence (TXPlan 1) The patient's UA results on 5/18/17 were positive for alcohol, he did report that he drank due to being stressed. The patient did not report any use in the last 7 days.    Dimension #2 - Biomedical - No concern.-Level 0 Client denies current medical issues. Client denies a current primary care clinic or physician. Client has health insurance and access to healthcare services.The patient is encouraged to develop a healthy lifestyle by implementing proper nutrition, exercise, and healthy sleeping habits (TXPlan 2). Prior to treatment the patient had a metal plate put in his jaw, he reports that the both of the screws have come out.The patient has been encouraged to seek medical attention for this matter however has not done so at this time.    Dimension #3 - Emotional , Behavioral and Cognitive - No concern. Level 0  Client denies mental health diagnosis or provider. Client denies history or current psychological significant stressors. Client denies history or abuse. Client denies history or current SI/SIB. The patient reports significant stressors due to his [place], the patient reports that this will be the longest he has ever been away from him.  The patient attributes this stress as to why he drank, the patient denies the need for individual therapy.   Dimension #4 - Treatment Resistance - minor. Level 1 Client has external motivation for treatment services through  "probation. Client has been using alcohol and received a DUI while on probation. Client refers to self as a \"social\" alcohol user however can see a pattern of how his use has caused significant problems in his life. Client reports willingness to participate in treatment services. The patient has been given his assignment packet with his use history (Complete) and written assignments(TXPlan 4).  The patient will be put on a treatment contract on 6/12/17, if he does not comply with that contract he will be discharged from the group.  The patient has been told the expectations of the group.    Dimension #5 - Relapse Potential - Serious concern. Level 3 Client reports longest period of abstinence from substance use has been 3.5 years due to incarceration. Client has been through treatment in the past due to marijuana use. Client had continued to use alcohol despite probation. At this time the patient is at a high vulnerability for use as he has recently used alcohol and appears to lack coping skills to manage life stressors.    Dimension #6 - Recovery Environment - Serious concern. Level 3 Client is unemployed with no form of income. Client has minimal daily structure. Client is currently residing with sister. The patient reports that he has support from his family and mainly his children.  He will be encouraged to develop daily structure that will include building sober support outside of his family, attending meetings, and finding employment (TXPlan 6). The patient is encouraged to attend at minimum 2 recovery meetings per week and obtain written verification.  The patient reports that he would like to take care of his student loan debt, the patient was told that he needed to call the company and find out to get the loans into deferment rather than default.    T) Client educated on acceptance. Client has completed 91 of 84 hours of program at this time. Projected discharge date is 6/22/17. Current discharge plan is " relapse prevention group.     AGUSTO Ventura        Psycho-Educational Curriculum  Date Attended  Psycho-Educational Curriculum  Date Attended    Acceptance  4/20 & 6/5 Shame/Guilt     1st Step  4/18 Anger/Rage     Defense Mechanisms and Self Acceptance  4/17     DSM Criteria 4/19     Spirituality 4/19     Affirmations   Mental Health     Cost of Addiction  6/6 Stages of grief 5/22   Defenses 6/7 Emotional intelligence  5/23   Coping Skills  6/8 You Be The Teacher 5/24     Mental Health Jeopardy  5/25   Automatic Negative Thoughts   Anxiety     Cross Addiction   Co-Occurring Disorders     Stages of Change   Yany/Bipolar     Relapse   Trauma      Addictive Thoughts  4/26 Victim Identity     Relapse warning signs  4/24 Pleasure Unwoven  4/3   Reasons people fail in early recovery  4/25 Mental Health Research Project 4/4     No Kidding Me 2  4/5     Mental Health Jeopardy  4/6   Coping Skills   Sober Structure  5/30   Relapse Prevention   Continuum of Care       Employment  6/1   Medical Aspects  5/4 Non-12 Step Support     ACES Score 5/2 Balance 4/10     Maslow 4/12 & 5/31     Goals 4/13   Brain/Neurotransmitters  3/15 &5/1 Priorities  4/11   Medication Compliance   Spirituality  5/9   Alcohol/Drug Research project 3/14 Weekend Planner     Physical stress symptoms 3/13 Educational Videos     Domenic's story 3/13 1st Step     What drugs do in your body 3/16 2nd Step     Sexual Health & AIDS 5/3 Assertive Communication     Short-Term/Long-Term Effects   My name is J Carlos SUMMERS.    Relationships  3/20 & 5/8 Cross Addiction     Assertive Communication  5/10 God As We Understood Him     Attitude 5/9     Healthy Relationships  5/11     Boundaries  3/22 HBO Relapse     Codependence    HBO What Is Addiction     Defense Mechanisms    Medical Aspects 1     Family Roles  3/22 Medical Aspects 2     Goodbye Letter   National Geographic: Stress     Listening Skills  3/21     Intimacy   PBS Depression Out of the Shadows    "  Needs/Dealbreakers in Relationships  3/23 The Anonymous People 4/27   Socialization Skills   [place]     Feelings  3/28 Andriy Loco \"Highjacked Brain\"    Anger 3/27     Emotional Regualtion 3/29     What is in your bag 3/30     ABC Model of Emotion   Pritesh Harvey Humor in [place]    Grief and Loss   The Mindfulness Movie    Healthy vs. Unhealthy Feelings   J Carlos BERMUDEZ documentary     Meditation/Mindfulness       Overconfidence/Complacency       Resentments       Primary Emotions 5/15     ABC's of CBT 5/17     \"Be This\" 5/18     Stress  5/16       "

## 2021-06-12 NOTE — PROGRESS NOTES
Weekly Progress Note  Morales Velásquez  1980  [id number]      D) Pt attended 1 groups  this week with 0 absences. A) Staff facilitated groups and reviewed tx progress. Assessed for [facility]. R) No VAP needed at this time. Pt working on the following dimensions:    Dimension #1 - Withdrawal Potential - Risk 0, no concern. Pt reported weekly use of alcohol which resulted in a DUI charge.  The patient's UA results on 5/18/17 were positive for alcohol, he did report that he drank due to being stressed. However, Pt continues to report last use as 1/22/17.   Dimension #2 - Biomedical - Risk 0, no concern. Pt neither reports nor presents with biomedical concerns at this time.   Dimension #3 - Emotional/Behavioral/Cognitive - Risk 1, mild concern. Pt denies mental health diagnosis or provider. [facility] another [facility].  He did talk to a therapist while in [facility], however feels as though he needs to seek more mental health attention.  He had a DA scheduled for 8/29/17. Pt presents as jovial in group, but does report stress due to lack of money and his cell phone was recently shut off.   Dimension #4 - Treatment Acceptance/Resistance - Risk 1, mild concern. Pt reports a desire to live a sober lifestyle and meet treatment expectations. Pt reports he is primarily motivated by probationary obligations and feels he has been a social drinker. Pt does appear to have made progress in IOP.   Dimension #5 - Relapse Potential - Risk 2, moderate concern. Pt remains highly vulnerable to relapse due to history or return to use and coping skills appear to be developing. Pt does appear to have made some progress at developing structure and working through triggers and urges to use.   Dimension #6 - Recovery Environment - Risk 3, serious concern. Pt reports he lives with his sister and has supportive family and friends. The patient is involved in many  [employer] organizations in the community in which he volunteers with on a regular basis.  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  Pt reports recent car trouble and he needs the money, so he is very hopeful for the employment to work out. Pt reports his eldest son has returned from summer trip to [place] and he is enjoying spending time with him.  Pt reports probationary obligations with [facility].     T) Patient educated on Prioritizing sober living. Patient has completed 98 hours of IOP. Pt has completed 8 hours of RP.  Projected discharge date is 9/29/17. Current discharge plan is TBD.     Donald Welander, LADC        Psycho-Educational Curriculum  Date Attended  Psycho-Educational Curriculum  Date Attended    Acceptance   Shame/Guilt     1st Step   Anger/Rage     Affirmations   Mental Health     Automatic Negative Thoughts   Anxiety     Cross Addiction   Co-Occurring Disorders     Stages of Change   Yany/Bipolar     Relapse   Trauma      Addictive Thoughts   Victim Identity     Coping Skills   Sober Structure     Relapse Prevention   Continuum of Care     Medical Aspects   Non-12 Step Support     Brain/Neurotransmitters   Priorities     Medication Compliance   Spirituality     NIMISHA Alcohol/Drug Research   Weekend Planner     Physical Health   Educational Videos     Post Acute Withdrawal   1st Step     Pregnancy and Drug Use   2nd Step     Sexual Health   Assertive Communication     Short-Term/Long-Term Effects   My name is J Carlos BERMUDEZ    [place]   Cross Addiction     Assertive Communication   God As We Understood Him     Boundaries   HBO Relapse     Codependence    HBO What Is Addiction     Defense Mechanisms    Medical Aspects 1     Family Roles   Medical Aspects 2     Goodbye Letter   National Geographic: Stress     Intimacy   PBS Depression Out of the Shadows     Needs/Dealbreakers in Relationships   The Anonymous  "People    Socialization Skills   [place]     Feelings   Andriy Loco \"Highjacked Brain\"    ABC Model of Emotion   Pritesh Harvey Humor in [place]    Grief and Loss   The Mindfulness Movie    Healthy vs. Unhealthy Feelings   J Carlos BERMUDEZ documentary     Meditation/Mindfulness       Overconfidence/Complacency       Resentments       Stress         "

## 2021-06-12 NOTE — PROGRESS NOTES
Weekly Progress Note  Morales Velásquez  1980  [id number]      D) Pt attended 1 groups  this week with 0 absences. A) Staff facilitated groups and reviewed tx progress. Assessed for [facility]. R) No VAP needed at this time. Pt working on the following dimensions:    Dimension #1 - Withdrawal Potential - Risk 0, no concern. Pt reported weekly use of alcohol which resulted in a DUI charge.  The patient's UA results on 5/18/17 were positive for alcohol, he did report that he drank due to being stressed. However, Pt continues to report last use as 1/22/17.   Dimension #2 - Biomedical - Risk 0, no concern. Pt neither reports nor presents with biomedical concerns at this time.   Dimension #3 - Emotional/Behavioral/Cognitive - Risk 1, mild concern. Pt denies mental health diagnosis or provider. [facility].  He did talk to a therapist while in shelter, however feels as though he needs to seek more mental health attention.  He had a DA scheduled with Will Colby in the [facility] on 7/11/17, but it has been rescheduled for 8/29/17.   Dimension #4 - Treatment Acceptance/Resistance - Risk 1, mild concern. Pt reports a desire to live a sober lifestyle and meet treatment expectations. Pt reports he is primarily motivated by probationary obligations and feels he has been a social drinker. Pt does appear to have made progress in IOP.   Dimension #5 - Relapse Potential - Risk 3, serious concern. Pt remains highly vulnerable to relapse due to history or return to use and coping skills appear minimal.   Dimension #6 - Recovery Environment - Risk 3, serious concern. Pt reports he lives with his sister and has supportive family and friends. The patient is involved in many [employer] organizations in the community in which he volunteers with on a regular basis.  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  "Pt reports recent car trouble and he needs the money, so he is very hopeful for the employment to work out. Pt reports probationary obligations with [facility].     T) Patient educated on One day at a time. Patient has completed 98 hours of IOP. Pt has completed 4 hours of RP.  Projected discharge date is 9/29/17. Current discharge plan is TBD.     Donald Welander, LADC        Psycho-Educational Curriculum  Date Attended  Psycho-Educational Curriculum  Date Attended    Acceptance   Shame/Guilt     1st Step   Anger/Rage     Affirmations   Mental Health     Automatic Negative Thoughts   Anxiety     Cross Addiction   Co-Occurring Disorders     Stages of Change   Yany/Bipolar     Relapse   Trauma      Addictive Thoughts   Victim Identity     Coping Skills   Sober Structure     Relapse Prevention   Continuum of Care     Medical Aspects   Non-12 Step Support     Brain/Neurotransmitters   Priorities     Medication Compliance   Spirituality     NIMISHA Alcohol/Drug Research   Weekend Planner     Physical Health   Educational Videos     Post Acute Withdrawal   1st Step     Pregnancy and Drug Use   2nd Step     Sexual Health   Assertive Communication     Short-Term/Long-Term Effects   My name is J Carlos BERMUDEZ    Relationships   Cross Addiction     Assertive Communication   God As We Understood Him     Boundaries   HBO Relapse     Codependence    HBO What Is Addiction     Defense Mechanisms    Medical Aspects 1     Family Roles   Medical Aspects 2     Goodbye Letter   National Geographic: Stress     Intimacy   PBS Depression Out of the Shadows     Needs/Dealbreakers in Relationships   The Anonymous People    Socialization Skills   [place]     Feelings   Andriy Loco \"Highjacked Brain\"    ABC Model of Emotion   Pritesh Harvey Humor in [place]    Grief and Loss   The Mindfulness Movie    Healthy vs. Unhealthy Feelings   J Carlos BERMUDEZ documentary     Meditation/Mindfulness       Overconfidence/Complacency       Resentments       Stress         "

## 2021-06-12 NOTE — PROGRESS NOTES
Weekly Progress Note  Morales Velásquez  1980  [id number]      D) Pt attended 1 groups  this week with 0 absences. A) Staff facilitated groups and reviewed tx progress. Assessed for [facility]. R) No VAP needed at this time. Pt working on the following dimensions:    Dimension #1 - Withdrawal Potential - Risk 0, no concern. Pt reported weekly use of alcohol which resulted in a DUI charge.  The patient's UA results on 5/18/17 were positive for alcohol, he did report that he drank due to being stressed. However, Pt continues to report last use as 1/22/17.   Dimension #2 - Biomedical - Risk 0, no concern. Pt neither reports nor presents with biomedical concerns at this time.   Dimension #3 - Emotional/Behavioral/Cognitive - Risk 1, mild concern. Pt denies mental health diagnosis or provider. [facility] he [facility] another [facility] commit [facility].  He did talk to a therapist while in alf, however feels as though he needs to seek more mental health attention.  He had a DA scheduled with Will Colby in the [facility] on 7/11/17, but it has been rescheduled for 8/29/17. Pt presents as upbeat and reports few stressors right now.   Dimension #4 - Treatment Acceptance/Resistance - Risk 1, mild concern. Pt reports a desire to live a sober lifestyle and meet treatment expectations. Pt reports he is primarily motivated by probationary obligations and feels he has been a social drinker. Pt does appear to have made progress in IOP.   Dimension #5 - Relapse Potential - Risk 3, serious concern. Pt remains highly vulnerable to relapse due to history or return to use and coping skills appear minimal.   Dimension #6 - Recovery Environment - Risk 3, serious concern. Pt reports he lives with his sister and has supportive family and friends. The patient is involved in many [employer] organizations in the community in which he volunteers with on a regular basis.  Pt reports he is currently  "unemployed, but reports he may have a job at a [employer], and has applied for the position.  Pt reports recent car trouble and he needs the money, so he is very hopeful for the employment to work out. Pt reports his eldest son has returned from summer trip to [place] and he is enjoying spending time with him.  Pt reports probationary obligations with [facility].     T) Patient educated on Medical Aspects. Patient has completed 98 hours of IOP. Pt has completed 6 hours of RP.  Projected discharge date is 9/29/17. Current discharge plan is TBD.     Donald Welander, LADC        Psycho-Educational Curriculum  Date Attended  Psycho-Educational Curriculum  Date Attended    Acceptance   Shame/Guilt     1st Step   Anger/Rage     Affirmations   Mental Health     Automatic Negative Thoughts   Anxiety     Cross Addiction   Co-Occurring Disorders     Stages of Change   Yany/Bipolar     Relapse   Trauma      Addictive Thoughts   Victim Identity     Coping Skills   Sober Structure     Relapse Prevention   Continuum of Care     Medical Aspects   Non-12 Step Support     Brain/Neurotransmitters   Priorities     Medication Compliance   Spirituality     NIMISHA Alcohol/Drug Research   Weekend Planner     Physical Health   Educational Videos     Post Acute Withdrawal   1st Step     Pregnancy and Drug Use   2nd Step     Sexual Health   Assertive Communication     Short-Term/Long-Term Effects   My name is J Carlos Sanon   Cross Addiction     Assertive Communication   God As We Understood Him     Boundaries   HBO Relapse     Codependence    HBO What Is Addiction     Defense Mechanisms    Medical Aspects 1     Family Roles   Medical Aspects 2     Goodbye Letter   National Geographic: Stress     Intimacy   PBS Depression Out of the Shadows     Needs/Dealbreakers in Relationships   The Anonymous People    Socialization Skills   [place]     Feelings   Andriy Loco \"Highjacked Brain\"    ABC Model of Emotion   Pritesh Harvey Humor in " [place]    Grief and Loss   The Mindfulness Movie    Healthy vs. Unhealthy Feelings   J Carlos BERMUDEZ documentary     Meditation/Mindfulness       Overconfidence/Complacency       Resentments       Stress

## 2021-06-12 NOTE — PROGRESS NOTES
Weekly Progress Note  Morales Velásquez  1980  [id number]      D) Pt attended 1 groups  this week with 0 absences. A) Staff facilitated groups and reviewed tx progress. Assessed for [facility]. R) No VAP needed at this time. Pt working on the following dimensions:    Dimension #1 - Withdrawal Potential - Risk 0, no concern. Pt reported weekly use of alcohol which resulted in a DUI charge.  The patient's UA results on 5/18/17 were positive for alcohol, he did report that he drank due to being stressed. However, Pt continues to report last use as 1/22/17.   Dimension #2 - Biomedical - Risk 0, no concern. Pt neither reports nor presents with biomedical concerns at this time.   Dimension #3 - Emotional/Behavioral/Cognitive - Risk 1, mild concern. Pt denies mental health diagnosis or provider. [facility].  He did talk to a therapist while in shelter, however feels as though he needs to seek more mental health attention.  He has a DA scheduled for 8/29/17.   Dimension #4 - Treatment Acceptance/Resistance - Risk 1, mild concern. Pt reports a desire to live a sober lifestyle and meet treatment expectations. Pt reports he is primarily motivated by probationary obligations and feels he has been a social drinker. Pt does appear to have made progress in IOP. Pt has a problem with chronic tardiness, but is meeting expectations at this time.   Dimension #5 - Relapse Potential - Risk 2, moderate concern. Pt remains highly vulnerable to relapse due to history or return to use and coping skills appear to be developing. Pt does appear to have made some progress at developing structure and working through triggers and urges to use.   Dimension #6 - Recovery Environment - Risk 3, serious concern. Pt reports he lives with his sister and has supportive family and friends. The patient is involved in many [employer] organizations in the community in  "which he volunteers with on a regular basis.  Pt reports he is still job hunting. Pt reports he is still enjoying spending time with his son.  Pt reports probationary obligations with [facility].     T) Patient educated on Exercise and Recovery. Patient has completed 98 hours of IOP. Pt has completed 10 hours of RP.  Projected discharge date is 9/29/17. Current discharge plan is TBD.     Donald Welander, LADC        Psycho-Educational Curriculum  Date Attended  Psycho-Educational Curriculum  Date Attended    Acceptance   Shame/Guilt     1st Step   Anger/Rage     Affirmations   Mental Health     Automatic Negative Thoughts   Anxiety     Cross Addiction   Co-Occurring Disorders     Stages of Change   Yany/Bipolar     Relapse   Trauma      Addictive Thoughts   Victim Identity     Coping Skills   Sober Structure     Relapse Prevention   Continuum of Care     Medical Aspects   Non-12 Step Support     Brain/Neurotransmitters   Priorities     Medication Compliance   Spirituality     NIMISHA Alcohol/Drug Research   Weekend Planner     Physical Health   Educational Videos     Post Acute Withdrawal   1st Step     Pregnancy and Drug Use   2nd Step     Sexual Health   Assertive Communication     Short-Term/Long-Term Effects   My name is J Carlos BERMUDEZ    Relationships   Cross Addiction     Assertive Communication   God As We Understood Him     Boundaries   HBO Relapse     Codependence    HBO What Is Addiction     Defense Mechanisms    Medical Aspects 1     Family Roles   Medical Aspects 2     Goodbye Letter   National Geographic: Stress     Intimacy   PBS Depression Out of the Shadows     Needs/Dealbreakers in Relationships   The Anonymous People    Socialization Skills   [place]     Feelings   Andriy Loco \"Highjacked Brain\"    ABC Model of Emotion   Pritesh Harvey Humor in [place]    Grief and Loss   The Mindfulness Movie    Healthy vs. Unhealthy Feelings   J Carlos BERMUDEZ documentary     Meditation/Mindfulness       Overconfidence/Complacency   "     Resentments       Stress

## 2021-06-12 NOTE — PROGRESS NOTES
Weekly Progress Note  Morales Velásquez  1980  [id number]      D) Pt attended 1 groups  this week with 0 absences. A) Staff facilitated groups and reviewed tx progress. Assessed for [facility]. R) No VAP needed at this time. Pt working on the following dimensions:    Dimension #1 - Withdrawal Potential - Risk 0, no concern. Pt reported weekly use of alcohol which resulted in a DUI charge.  The patient's UA results on 5/18/17 were positive for alcohol, he did report that he drank due to being stressed. However, Pt continues to report last use as 1/22/17.   Dimension #2 - Biomedical - Risk 0, no concern. Pt neither reports nor presents with biomedical concerns at this time.   Dimension #3 - Emotional/Behavioral/Cognitive - Risk 1, mild concern. Pt denies mental health diagnosis or provider. [facility].  He did talk to a therapist while in residential, however feels as though he needs to seek more mental health attention.  Pt reported his son is getting the care that he needs, so it is less stressful, but difficult. Pt reports most stress revolves around finances.   Dimension #4 - Treatment Acceptance/Resistance - Risk 0, no concern. Pt reports a desire to live a sober lifestyle and meet treatment expectations. Pt reports he is primarily motivated by probationary obligations and feels he has been a social drinker. Pt does appear to have made progress in IOP.   Dimension #5 - Relapse Potential - Risk 2, moderate concern. Pt remains highly vulnerable to relapse due to history or return to use and coping skills appear to be developing. Pt does appear to have made some progress at developing structure and working through triggers and urges to use.   Dimension #6 - Recovery Environment - Risk 3, serious concern. Pt reports he lives with his sister and has supportive family and friends. The patient is involved in many [employer] organizations in  "the community in which he volunteers with on a regular basis.  Pt reports he is still job hunting. Pt reports he is still enjoying spending time with his son.  Pt reports probationary obligations with [facility]. Pt reported he enjoyed passing out free back packs and school supplies at two schools during the start of school last week.     T) Patient educated on Mind change. Patient has completed 98 hours of IOP. Pt has completed 14 hours of RP.  Projected discharge date is 10/27/17Current discharge plan is TBD.     Donald Welander, LADC        Psycho-Educational Curriculum  Date Attended  Psycho-Educational Curriculum  Date Attended    Acceptance   Shame/Guilt     1st Step   Anger/Rage     Affirmations   Mental Health     Automatic Negative Thoughts   Anxiety     Cross Addiction   Co-Occurring Disorders     Stages of Change   Yany/Bipolar     Relapse   Trauma      Addictive Thoughts   Victim Identity     Coping Skills   Sober Structure     Relapse Prevention   Continuum of Care     Medical Aspects   Non-12 Step Support     Brain/Neurotransmitters   Priorities     Medication Compliance   Spirituality     NIMISHA Alcohol/Drug Research   Weekend Planner     Physical Health   Educational Videos     Post Acute Withdrawal   1st Step     Pregnancy and Drug Use   2nd Step     Sexual Health   Assertive Communication     Short-Term/Long-Term Effects   My name is J Carlos BERMUDEZ    [place]   Cross Addiction     Assertive Communication   God As We Understood Him     Boundaries   HBO Relapse     Codependence    HBO What Is Addiction     Defense Mechanisms    Medical Aspects 1     Family Roles   Medical Aspects 2     Goodbye Letter   National Geographic: Stress     Intimacy   PBS Depression Out of the Shadows     Needs/Dealbreakers in Relationships   The Anonymous People    Socialization Skills   [place]     Feelings   Andriy Loco \"Highjacked Brain\"    ABC Model of Emotion   Pritesh Harvey Humor in [place]    Grief and Loss   The " Mindfulness Movie    Healthy vs. Unhealthy Feelings   J Carlos BERMUDEZ documentary     Meditation/Mindfulness       Overconfidence/Complacency       Resentments       Stress

## 2021-06-12 NOTE — PROGRESS NOTES
Weekly Progress Note  Morales Velásquez  1980  [id number]      D) Pt attended 1 groups  this week with 0 absences. A) Staff facilitated groups and reviewed tx progress. Assessed for [facility]. R) No VAP needed at this time. Pt working on the following dimensions:    Dimension #1 - Withdrawal Potential - Risk 0, no concern. Pt reported weekly use of alcohol which resulted in a DUI charge.  The patient's UA results on 5/18/17 were positive for alcohol, he did report that he drank due to being stressed. However, Pt continues to report last use as 1/22/17.   Dimension #2 - Biomedical - Risk 0, no concern. Pt neither reports nor presents with biomedical concerns at this time.   Dimension #3 - Emotional/Behavioral/Cognitive - Risk 1, mild concern. Pt denies mental health diagnosis or provider. The patient was incarcerated in [facility] where he witnessed another inmate commit suicide.  He did talk to a therapist while in nursing home, however feels as though he needs to seek more mental health attention.  Pt had a DA scheduled on 8/29 but no showed. Pt reports recent stress from son being hospitalized again for a tumor in his jaw. Pt processed with staff and peers.   Dimension #4 - Treatment Acceptance/Resistance - Risk 0, no concern. Pt reports a desire to live a sober lifestyle and meet treatment expectations. Pt reports he is primarily motivated by probationary obligations and feels he has been a social drinker. Pt does appear to have made progress in IOP. Pt has improved his punctuality.  Dimension #5 - Relapse Potential - Risk 2, moderate concern. Pt remains highly vulnerable to relapse due to history or return to use and coping skills appear to be developing. Pt does appear to have made some progress at developing structure and working through triggers and urges to use.   Dimension #6 - Recovery Environment - Risk 3, serious concern. Pt reports he lives with his sister and has supportive family and friends. The patient  "is involved in many [email or web address] organizations in the community in which he volunteers with on a regular basis.  Pt reports he is still job hunting. Pt reports he is still enjoying spending time with his son.  Pt reports probationary obligations with [facility].     T) Patient educated on Sober skills. Patient has completed 98 hours of IOP. Pt has completed 12 hours of RP.  Projected discharge date is 10/27/17Current discharge plan is TBD.     Donald Welander, LADC        Psycho-Educational Curriculum  Date Attended  Psycho-Educational Curriculum  Date Attended    Acceptance   Shame/Guilt     1st Step   Anger/Rage     Affirmations   Mental Health     Automatic Negative Thoughts   Anxiety     Cross Addiction   Co-Occurring Disorders     Stages of Change   Yany/Bipolar     Relapse   Trauma      Addictive Thoughts   Victim Identity     Coping Skills   Sober Structure     Relapse Prevention   Continuum of Care     Medical Aspects   Non-12 Step Support     Brain/Neurotransmitters   Priorities     Medication Compliance   Spirituality     NIMISHA Alcohol/Drug Research   Weekend Planner     Physical Health   Educational Videos     Post Acute Withdrawal   1st Step     Pregnancy and Drug Use   2nd Step     Sexual Health   Assertive Communication     Short-Term/Long-Term Effects   My name is J Carlos BERMUDEZ    Relationships   Cross Addiction     Assertive Communication   God As We Understood Him     Boundaries   HBO Relapse     Codependence    HBO What Is Addiction     Defense Mechanisms    Medical Aspects 1     Family Roles   Medical Aspects 2     Goodbye Letter   National Geographic: Stress     Intimacy   PBS Depression Out of the Shadows     Needs/Dealbreakers in Relationships   The Anonymous People    Socialization Skills   [place]     Feelings   Andriy Loco \"Highjacked Brain\"    ABC Model of Emotion   Pritesh Harvey Humor in [place]    Grief and Loss   The Mindfulness Movie    Healthy vs. Unhealthy Feelings   J Carlos BERMUDEZ documentary   "   Meditation/Mindfulness       Overconfidence/Complacency       Resentments       Stress

## 2021-06-13 NOTE — PROGRESS NOTES
Weekly Progress Note  Morales Velásquez  1980  [id number]    D) Pt attended 1 groups this week with 0 absences. Patient attended 0 individual sessions this week.   A) Staff facilitated groups and reviewed tx progress. Assessed for [facility]. R) No VAP needed at this time. Pt working on the following dimensions:    Dimension #1 - Withdrawal Potential - Risk 0. No concern.  Specific goals from treatment plan addressed this week:  Maintain abstinence while attending Relapse Prevention as a way of gaining awareness of your thoughts, feelings and aspirations for recovery. Report any relapses, if any, on any substances of abuse to staff immediately.   Effectiveness of strategies: The patient's UA results on 5/18/17 were positive for alcohol, he did report that he drank due to being stressed. However, Pt continues to report last use as 1/22/17.     Dimension #2 - Biomedical - Risk 0. No concern   Specific goals from treatment plan addressed this week:  Get proper rest, nutrition, and exercise in order to promote good brain and body function. Inform staff immediately of any changes in your health that may affect your active participation in group therapy or attendance.  Effectiveness of strategies: Pt neither reports nor presents with biomedical concerns at this time.     Dimension #3 - Emotional/Behavioral/Cognitive - Risk 1. Mild concern.  Specific goals from treatment plan addressed this week:   Report to staff any changes in your mental health that may affect your attendance or participation in group therapy.   Effectiveness of strategies:  Pt denies MH diagnosis or provider. Pt presented to group as stable and in an upbeat mood. Pt does report ongoing stress due to son being treated for cancerous tumor in jaw.     Dimension #4 - Treatment Acceptance/Resistance - Risk 1. Mild concern  Specific goals from treatment plan addressed this week:  Attend Relapse Prevention groups Thursdays 10:30 to 12:30 and share thoughts,  feelings and urges to use, as well as sober supports with staff and peers.  Effectiveness of strategies:  Pt reports a desire to live sober, but continues to minimize use. Pt appears primarily motivated by legal concerns. Pt attendance has been spotty, but partly due to miscommunication regarding insurance coverage.     Dimension #5 - Relapse Potential - Risk 2. Moderate concern  Specific goals from treatment plan addressed this week:  Dealing effectively with relapse triggers and stressors while building coping skills in order to handle life events without resorting to drug/alcohol use.   Effectiveness of strategies:  Pt reports ongoing abstinence, but remains vulnerable to relapse due to history of return to use.     Dimension #6 - Recovery Environment - Risk 3. Serious concern  Specific goals from treatment plan addressed this week:  Find 2 sober support groups you feel comfortable attending on a weekly basis and inform counselor how these meetings are impacting you.   Effectiveness of strategies:  Pt is still not attending community support groups. Pt reports he continues to live with his sister and spends time with his children. Pt reports continued job hunting with an interview coming up with [employer]. Pt has probationary obligations.     T) Treatment plan updated no.  Patient notified and in agreement NA.  Patient educated on Building sober habits. Patient has completed 98 horus of IOP. Pt has completed 16 hours of Relapse Prevention aftercare.  Projected discharge date is 12/1/17. Current discharge plan is comply with probationary obligations and maintain abstinence.     Donald Welander, LADC  10/16/2017, 9:58 AM      Relapse Prevention Aftercare  Psycho-Educational Curriculum  Date Attended  Mindfulness Curriculum  Date Attended    Crossing over into Sober Living--Pat Alcoholism h/o  Neurobiology of Mindfulness    Staying motivated in Long Term Recovery-h/o  Core Attitudes    Keeping it Simple-h/o  Daily  practice    Relapse Prevention Quiz-h/o  Awareness    Prioritizing Sober Living-24hours a day h/o  Thoughts    Symptoms of Relapse-h/o  Emotions    Tools for Recovery-h/o  Physical body     Acceptance-group exercise  Intention    Finding gratitude  [email or web address]    Building sober habits  Clarity    Sober support groups 10/12/17 Optimism    Have a Relapse Prevention Plan-written  Happiness

## 2021-06-13 NOTE — PROGRESS NOTES
*late entry   Individual Treatment Plan    Patient  Name: Morales Velásquez  MRN: [id number]   : 1980  Admit Date: 3/13/17  Date of Initial Service Plan: 3/13/17  Tentative Discharge Date: 17    Counselor: Donald Welander, LADC      Dimension 1: Acute Intoxication/Withdrawal Potential, Risk level: 0    Problem: The patient's UA results on 17 were positive for alcohol, he did report that he drank due to being stressed. However, Pt continues to report last use as 17. Pt reports continued abstinence since starting Relapse Prevention.   Goal: Remain clean and sober throughout Relapse Prevention group in order to avoid experiencing withdrawal symptoms and to meet group expectations.   Must be reached to complete treatment? Yes  Methods/Strategies (must include amount and frequency): Maintain abstinence while attending Relapse Prevention as a way of gaining awareness of your thoughts, feelings and aspirations for recovery. Report any relapses, if any, on any substances of abuse to staff immediately.   Target Date: 17  Completion Date:        Dimension 2: Biomedical Conditions/Complications, Risk level: 0    Problem: Pt reports no biomedical concerns and has access to care as needed.   Goal: Continue to develop healthy habits that enhance your recovery lifestyle.   Must be reached to complete treatment? No  Methods/Strategies (must include amount and frequency): Get proper rest, nutrition, and exercise in order to promote good brain and body function.  Target Date: 17  Completion Date:         Dimension 3: Emotional/Behavioral/Cognitive, Risk level: 1    Problem: Pt denies mental health diagnosis or provider. The patient was recently incarcerated in [facility] group home where he witnessed another inmate commit suicide.  He did talk to a therapist while in group home. Pt was offered DA in the [facility] but no showed for appointment. Pt reports recent stress due to son being treated for facial tumor.    Goal: To treat mental health effectively while attending aftercare in order to increase your ability to meet goals and treatment expectations.   Must be reached to complete treatment? No  Methods/Strategies (must include amount and frequency): Report to staff any changes in your mental health that may affect your attendance or participation in group therapy. Talk to staff and peers about your mood and overall mental health during weekly check-ins.   Target Date: 12/1/17  Completion Date:         Dimension 4: Readiness to Change, Risk level 1    Problem: Pt reports a desire to live a sober lifestyle and meet treatment expectations. Pt reports he is primarily motivated by probationary obligations and feels he has been a social drinker.  Goal: To follow through with intentions to treat chemical dependency concerns while meeting Relapse Prevention group expectations in order to graduate successfully from our program.   Must be reached to complete treatment? Yes  Methods/Strategies (must include amount and frequency): Attend Relapse Prevention groups every Thursday from 10:30am to 12:30pm and share thoughts, feelings and urges to use, as well as sober supports with staff and peers in order to maintain awareness of details shaping your recovery process.  Target Date: 12/1/17  Completion Date:         Dimension 5: Relapse/Continued Use/Continued Problem Potential, Risk level: 2    Problem: Pt remains vulnerable to relapse due to history or return to use and coping skills are still developing.   Goal: To deal effectively with relapse triggers and stressors while building coping skills in order to handle life events without resorting to drug/alcohol use.   Must be reached to complete treatment? Yes  Methods/Strategies (must include amount and frequency): Share the sober living skills you have learned with other group members in order to help them in their recovery. Continue to improve upon your recovery skills and share  how you are able to manage urges and triggers to return to use.   Target Date: 12/1/17  Completion Date:       Dimension 6: Recovery Environment, Risk level: 3    Problem:  Pt is still not attending community support groups. Pt reports he continues to live with his sister and spends time with his children.  Pt has probationary obligations.   Goal: To build meaningful structure into your weekly schedule by trying out community support groups.   Must be reached to complete treatment? No  Methods/Strategies (must include amount and frequency): Find 1 sober support group you feel comfortable attending on a weekly basis and inform counselor how this meeting is impacting you.   Target Date: 12/1/17  Completion Date:     Problem: Pt is unemployed and lacks the structure that a job provides.   Goal: To build meaningful structure by finding full or part time employment that is conducive with recovery.   Must be reached to complete treatment? No  Methods/Strategies (must include amount and frequency): Continue to actively seek employment. Report to staff when you do find a job. Pt reports an interview coming up with [employer].  Target Date: 12/1/17  Completion Date:         Resources  Resources to which the patient is being referred for problems when problems are to be addressed concurrently by another provider: none      By signing this document, I am acknowledging that I was actively and directly involved in the development of my treatment plan.           Patient  Signature_________________________________________         Date__________________        Staff Signature  Donald Welander, [facility]    Date: 10/19/2017  , 9:52 AM

## 2021-06-13 NOTE — PROGRESS NOTES
Weekly Progress Note  Morales Velásquez  1980  [id number]    D) Pt attended 1 groups this week with 0 absences. Patient attended 0 individual sessions this week.   A) Staff facilitated groups and reviewed tx progress. Assessed for [facility].   R) No VAP needed at this time.   Any significant events, defines as events that impact patients relationship with others inside and outside of treatment Yes: New job at [employer]    Indicate any changes or monitoring of physical or mental health problems No    Indicate involvement by any outside supports No    IAPP reviewed and modified as needed. NA  Pt working on the following dimensions:    Dimension #1 - Withdrawal Potential - Risk 0. No concern.  Specific goals from treatment plan addressed this week:The patient's UA results on 5/18/17 were positive for alcohol, he did report that he drank due to being stressed.   Maintain abstinence while attending Relapse Prevention as a way of gaining awareness of your thoughts, feelings and aspirations for recovery. Report any relapses, if any, on any substances of abuse to staff immediately.   Effectiveness of strategies: Pt continues to report last use as 1/22/17.     Dimension #2 - Biomedical - Risk 0. Pt neither reports nor presents with biomedical concerns at this time.   Specific goals from treatment plan addressed this week:  Get proper rest, nutrition, and exercise in order to promote good brain and body function. Inform staff immediately of any changes in your health that may affect your active participation in group therapy or attendance.  Effectiveness of strategies: Pt reports being tired due to work schedule.     Dimension #3 - Emotional/Behavioral/Cognitive - Risk 1. Pt denies MH diagnosis or provider. Pt does report ongoing stress due to son being treated for cancerous tumor in jaw.  Specific goals from treatment plan addressed this week:   Report to staff any changes in your mental health that may affect your attendance or  participation in group therapy.   Effectiveness of strategies:   Pt reported no new concerns other than some stress due to getting used to a regular work schedule.     Dimension #4 - Treatment Acceptance/Resistance - Risk 1. Pt reports a desire to live sober, but continues to minimize use. Pt appears primarily motivated by legal concerns.  Specific goals from treatment plan addressed this week:  Attend Relapse Prevention groups Thursdays 10:30 to 12:30 and share thoughts, feelings and urges to use, as well as sober supports with staff and peers.  Effectiveness of strategies:   Pt  was active with staff and peers.     Dimension #5 - Relapse Potential - Risk 2. Pt remains vulnerable to relapse due to history of return to use.   Specific goals from treatment plan addressed this week:  Dealing effectively with relapse triggers and stressors while building coping skills in order to handle life events without resorting to drug/alcohol use.   Effectiveness of strategies:  Pt reports ongoing abstinence.    Dimension #6 - Recovery Environment - Risk 3. Pt reports he continues to live with his sister and spends time with his children. Pt reports he is now working at [employer] in [place].  Pt has probationary obligations.   Specific goals from treatment plan addressed this week:  Find 2 sober support groups you feel comfortable attending on a weekly basis and inform counselor how these meetings are impacting you.   Effectiveness of strategies:  Pt is still not attending community support groups.     T) Treatment plan updated no.  Patient notified and in agreement NA.  Patient educated on building sober habits. Patient has completed 98 horus of IOP. Pt has completed 20 hours of Relapse Prevention aftercare.  Projected discharge date is 12/1/17. Current discharge plan is comply with probationary obligations and maintain abstinence.     Donald Welander, LADC  11/1/2017, 3:05 PM      Relapse Prevention  Aftercare  Psycho-Educational Curriculum  Date Attended  Mindfulness Curriculum  Date Attended    Crossing over into Sober Living--Pat Alcoholism h/o  Neurobiology of Mindfulness    Staying motivated in Long Term Recovery-h/o  Core Attitudes    Keeping it Simple-h/o  Daily practice    Relapse Prevention Quiz-h/o  Awareness    Prioritizing Sober Living-24hours a day h/o  Thoughts    Symptoms of Relapse-h/o  Emotions    Tools for Recovery-h/o  Physical body     Acceptance-group exercise  Intention    Finding gratitude 10/19/17 C.A.L.M.    Building sober habits 10/26/17 Clarity    Sober support groups 10/12/17 Optimism    Have a Relapse Prevention Plan-written  Happiness

## 2021-06-13 NOTE — PROGRESS NOTES
Weekly Progress Note  Morales Velásquez  1980  [id number]    D) Pt attended 1 groups this week with 0 absences. Patient attended 0 individual sessions this week.   A) Staff facilitated groups and reviewed tx progress. Assessed for [facility].   R) No VAP needed at this time.   Any significant events, defines as events that impact patients relationship with others inside and outside of treatment Yes: New job at [employer]    Indicate any changes or monitoring of physical or mental health problems No    Indicate involvement by any outside supports No    IAPP reviewed and modified as needed. NA  Pt working on the following dimensions:    Dimension #1 - Withdrawal Potential - Risk 0. No concern.  Specific goals from treatment plan addressed this week:  Maintain abstinence while attending Relapse Prevention as a way of gaining awareness of your thoughts, feelings and aspirations for recovery. Report any relapses, if any, on any substances of abuse to staff immediately.   Effectiveness of strategies: The patient's UA results on 5/18/17 were positive for alcohol, he did report that he drank due to being stressed. However, Pt continues to report last use as 1/22/17.     Dimension #2 - Biomedical - Risk 0. No concern   Specific goals from treatment plan addressed this week:  Get proper rest, nutrition, and exercise in order to promote good brain and body function. Inform staff immediately of any changes in your health that may affect your active participation in group therapy or attendance.  Effectiveness of strategies: Pt neither reports nor presents with biomedical concerns at this time.     Dimension #3 - Emotional/Behavioral/Cognitive - Risk 1. Mild concern.  Specific goals from treatment plan addressed this week:   Report to staff any changes in your mental health that may affect your attendance or participation in group therapy.   Effectiveness of strategies:  Pt denies MH diagnosis or provider. Pt presented to group as  stable and in an upbeat mood. Pt does report ongoing stress due to son being treated for cancerous tumor in jaw. Pt was also very positive of mood due to recent job hire.     Dimension #4 - Treatment Acceptance/Resistance - Risk 1. Mild concern  Specific goals from treatment plan addressed this week:  Attend Relapse Prevention groups Thursdays 10:30 to 12:30 and share thoughts, feelings and urges to use, as well as sober supports with staff and peers.  Effectiveness of strategies:  Pt reports a desire to live sober, but continues to minimize use. Pt appears primarily motivated by legal concerns. Pt returned to group sessions and was active with staff and peers.     Dimension #5 - Relapse Potential - Risk 2. Moderate concern  Specific goals from treatment plan addressed this week:  Dealing effectively with relapse triggers and stressors while building coping skills in order to handle life events without resorting to drug/alcohol use.   Effectiveness of strategies:  Pt reports ongoing abstinence, but remains vulnerable to relapse due to history of return to use.     Dimension #6 - Recovery Environment - Risk 3. Serious concern  Specific goals from treatment plan addressed this week:  Find 2 sober support groups you feel comfortable attending on a weekly basis and inform counselor how these meetings are impacting you.   Effectiveness of strategies:  Pt is still not attending community support groups. Pt reports he continues to live with his sister and spends time with his children. Pt reports he is now working at [employer] in [place].  Pt has probationary obligations.     T) Treatment plan updated no.  Patient notified and in agreement NA.  Patient educated on Finding gratitude. Patient has completed 98 horus of IOP. Pt has completed 18 hours of Relapse Prevention aftercare.  Projected discharge date is 12/1/17. Current discharge plan is comply with probationary obligations and maintain abstinence.     Gregg  Welander, LADC  10/25/2017, 2:10 PM      Relapse Prevention Aftercare  Psycho-Educational Curriculum  Date Attended  Mindfulness Curriculum  Date Attended    Crossing over into Sober Living--Pat Alcoholism h/o  Neurobiology of Mindfulness    Staying motivated in Long Term Recovery-h/o  Core Attitudes    Keeping it Simple-h/o  Daily practice    Relapse Prevention Quiz-h/o  Awareness    Prioritizing Sober Living-24hours a day h/o  Thoughts    Symptoms of Relapse-h/o  Emotions    Tools for Recovery-h/o  Physical body     Acceptance-group exercise  Intention    Finding gratitude 10/19/17 C.A.L.M.    Building sober habits  Clarity    Sober support groups 10/12/17 Optimism    Have a Relapse Prevention Plan-written  Happiness

## 2021-06-14 NOTE — PROGRESS NOTES
[facility] SUBSTANCE USE DISORDER  DISCHARGE SUMMARY            NAME:  Morales Velásquez   Physician:  No Primary Care Provider   MRN:  [id number] :  Donald Welander, [facility]   SS#:  [id number] Funding Source:  [facility]   Admit Date: 3/13/17 Discharge Date: 2017     :  1980 Hours Completed: 98 hours IOP, 23 hours Relapse Prevention aftercare   Initial Diagnosis:  f10.20, Alcohol Use Disorder; Moderate. Final Diagnosis:  f10.20, Alcohol Use Disorder; Moderate.   Discharge Address:    [address] [place] [id number]      Discharge Type:  With Staff Approval (WSA)    Reasons for and circumstances of service termination:  Morales has successfully completed both IOP and the Relapse Prevention aftercare program. Pt met all treatment attendance, engagement, and abstinence expectations and is discharged on this date, 2017, WSA.        Dimension/Course of Treatment/Individualized Care:   1.  Withdrawal Potential - Risk level -  0 Pt reports continued abstinence. No withdrawal concerns.   Treatment plan goals and progress towards those goals: Met     2.  Biomedical Conditions and Complications - Risk level -  0 Pt neither reports nor presents with biomedical concerns at this time.   Treatment plan goals and progress towards those goals: Met  Pt reports being tired due to work schedule.      3.  Emotional/Behavioral/Cognitive Conditions and Complications - Risk level -  1  Treatment plan goals and progress towards those goals: Met  Pt denies MH diagnosis or provider. Pt does report ongoing stress due to son being treated for cancerous tumor in jaw. Pt reports being happy that he is working a lot.      4.  Treatment Acceptance/Resistance - Risk Level -  0, Pt reports a desire to live a sober lifestyle. Pt is primarily motivated by legal concerns.   Treatment plan goals and progress towards those goals: Met  Pt met all treatment expectations.      5.  Relapse/Continued Use/Continued Problem  Potential - Risk level -  2  Treatment plan goals and progress towards those goals: Making progress  Pt remains vulnerable to relapse due to history of return to use.  However Pt does appear to have gained some coping skills and is beginning to see some benefits of living sober.      6.  Recovery Environment - Risk level -  2 Pt reports he continues to live with his sister and spends time with his children. Pt reports he is now working at [employer] in [place].  Pt has probationary obligations.   Treatment plan goals and progress towards those goals: Making progress  Pt did become gainfully employed and is working around 40 hours per week. Pt has not attended sober support groups and does not appear interested in doing so. Pt did appear to connect with peers in group.      Strengths: Good sense of humor, supportive of peers, and aware that alcohol negatively affects his life.   Needs: To remain clean and sober, employed, and engaged with his family.    Services Provided: Intake, assessment, treatment planning, education, group discussion, film, lectures, 1x1 therapy, and recommendations at discharge.        Program Involvement: Good  Attendance: Fair  Ability to relate in group/   Other program activities: Fair  Assignment Completion: Fair  Overall Behavior: Good  Reported Family/Significant   Other Involvement: Fair    Prognosis: Good    Recommendations       Maintain abstinence, probation compliance.     Mental Health Referral  None at discharge.     Physical Health Referral:  Defer to MD.        Counselor Name and Title:  Donald Welander, LADC        Date:  11/20/2017  Time:  2:29 PM

## 2021-06-14 NOTE — PROGRESS NOTES
Weekly Progress Note  Morales Velásquez  1980  [id number]    D) Pt attended 1 groups this week with 0 absences. Patient attended 0 individual sessions this week.   A) Staff facilitated groups and reviewed tx progress. Assessed for [facility].   R) No VAP needed at this time.   Any significant events, defines as events that impact patients relationship with others inside and outside of treatment No    Indicate any changes or monitoring of physical or mental health problems No    Indicate involvement by any outside supports No    IAPP reviewed and modified as needed. NA  Pt working on the following dimensions:    Dimension #1 - Withdrawal Potential - Risk 0. No concern.  Specific goals from treatment plan addressed this week:The patient's UA results on 5/18/17 were positive for alcohol, he did report that he drank due to being stressed.   Maintain abstinence while attending Relapse Prevention as a way of gaining awareness of your thoughts, feelings and aspirations for recovery. Report any relapses, if any, on any substances of abuse to staff immediately.   Effectiveness of strategies: Pt continues to report last use as 1/22/17.     Dimension #2 - Biomedical - Risk 0. Pt neither reports nor presents with biomedical concerns at this time.   Specific goals from treatment plan addressed this week:  Get proper rest, nutrition, and exercise in order to promote good brain and body function. Inform staff immediately of any changes in your health that may affect your active participation in group therapy or attendance.  Effectiveness of strategies: Pt reports being tired due to work schedule.     Dimension #3 - Emotional/Behavioral/Cognitive - Risk 1. Pt denies MH diagnosis or provider. Pt does report ongoing stress due to son being treated for cancerous tumor in jaw.  Specific goals from treatment plan addressed this week:   Report to staff any changes in your mental health that may affect your attendance or participation in group  therapy.   Effectiveness of strategies:   Pt reported some stress due to probation putting out a warrant for his arrest, but he hopes to resolve it at his next PO meeting.     Dimension #4 - Treatment Acceptance/Resistance - Risk 1. Pt reports a desire to live sober, but continues to minimize use. Pt appears primarily motivated by legal concerns.  Specific goals from treatment plan addressed this week:  Attend Relapse Prevention groups Thursdays 10:30 to 12:30 and share thoughts, feelings and urges to use, as well as sober supports with staff and peers.  Effectiveness of strategies:   Pt  was active with staff and peers.     Dimension #5 - Relapse Potential - Risk 2. Pt remains vulnerable to relapse due to history of return to use.   Specific goals from treatment plan addressed this week:  Dealing effectively with relapse triggers and stressors while building coping skills in order to handle life events without resorting to drug/alcohol use.   Effectiveness of strategies:  Pt reports ongoing abstinence.    Dimension #6 - Recovery Environment - Risk 3. Pt reports he continues to live with his sister and spends time with his children. Pt reports he is now working at [employer] in [place].  Pt has probationary obligations.   Specific goals from treatment plan addressed this week:  Find 2 sober support groups you feel comfortable attending on a weekly basis and inform counselor how these meetings are impacting you.   Effectiveness of strategies:  Pt is still not attending community support groups. Pt reports he is working at least 40 hours per week and needs to due to continued car trouble.     T) Treatment plan updated no.  Patient notified and in agreement NA.  Patient educated on Recovery Sabotage.  Patient has completed 98 horus of IOP. Pt has completed 22 hours of Relapse Prevention aftercare.  Projected discharge date is 12/1/17. Current discharge plan is comply with probationary obligations and maintain  abstinence.     Donald Welander, [facility]  11/13/2017, 11:32 AM      Relapse Prevention Aftercare  Psycho-Educational Curriculum  Date Attended  Mindfulness Curriculum  Date Attended    Crossing over into Sober Living--Pat Alcoholism h/o  Neurobiology of Mindfulness    Staying motivated in Long Term Recovery-h/o  Core Attitudes    Keeping it Simple-h/o  Daily practice    Relapse Prevention Quiz-h/o  Awareness    Prioritizing Sober Living-24hours a day h/o  Thoughts    Symptoms of Relapse-h/o  Emotions    Tools for Recovery-h/o  Physical body     Acceptance-group exercise  Intention    Finding gratitude 10/19/17 C.A.L.M.    Building sober habits 10/26/17 Recovery Sabotage 11/9/17   Sober support groups 10/12/17 Optimism    Have a Relapse Prevention Plan-written  Happiness

## 2021-07-03 NOTE — ADDENDUM NOTE
Addendum Note by Arnulfo Gamez MD at 3/15/2017  8:38 AM     Author: Arnulfo Gamez MD Service: -- Author Type: Physician    Filed: 3/15/2017  8:38 AM Encounter Date: 3/13/2017 Status: Signed    : Arnulfo Gamez MD (Physician)    Addended by: ARNULFO GAMEZ on: 3/15/2017 08:38 AM        Modules accepted: Orders

## 2021-07-04 NOTE — PROGRESS NOTES
Rule 31 by Valente Neville LADC at 3/13/2017  2:00 PM     Author: Valente Neville LADC Service: -- Author Type: Licensed Alcohol and Drug Counselor    Filed: 3/13/2017  4:31 PM Encounter Date: 3/13/2017 Status: Signed    : Valente Neville LADC (Licensed Alcohol and Drug Counselor)         [facility] Assessment Summary  Date: 3/13/2017        : AGUSTO Rodriguez    Name: Morales Velásquez  Address: [address] [place] [address]    Phone: [phone] (home)   Referral Source: [place] [facility]  : 1980  Age: 36 y.o.  Race/Ethnicity: Black or   Marital Status: Single  Employment: None                                                                                                                       Level of Education: 11th                Socio-economic (yearly Income) Status: None  Sexual Orientation: Hetero       Last 4 digits of Social Security: [phone]    Reason for seeking services    Client received a DUI charge while on probation for a firearm possession charge.    Dimension I Acute intoxication/Withdrawal Potential:    Symptomology (past 12 months, check all that apply)  []Increased tolerance, []passing out, [] binges, []AM use, [x]weekly intoxication, [] decreased tolerance, [] blackouts, []secretive use, []preoccupation, []protecting supply, [] hurried ingestion, []medicinal use, []using alone, [x]repeated family or social problems, [] mood swings, []loss of control, [x]family history of addiction    Observed or reported (withdrawal symptoms, check all that apply)  []SWEATING (RAPID PULSE), [] NAUSEA/VOMITING, []SHAKY/JITTERY/TREMORS, []DIZZINESS, []UNABLE TO SLEEP, []SEIZURES, []AGITATION, [] DIARRHEA, []HEADACHE, []DIMINISHED APPETITE,[]FATIGUE/EXTREMELY TIRED, []HALLUCINATIONS, []SAD /DEPRESSED FEELING, []FEVER,[]MUSCLE ACHES, []UNABLE TO EAT, []VIVID /UNPLEASANT DREAMS, []PSYCHOSIS, []IRRITABILITY, []CONFUSED/DISRUPTED SPEECH, []SENSITIVITY TO NOISE, []  ANXIETY/WORRIED,[]HIGH BLOOD PRESSURE          Chemical use most recent 12 months outside a facility and other significant use history (client self-report)  Primary Drug Used  Age of First Use  Most Recent Pattern of Use and Duration    Date of last use and time, if needed  Withdrawal Potential? Requiring special care  Method of use   (oral, smoked, snort, IV, etc)    Alcohol  18 1x per week; 2-3 drinks each time. 19 None Oral   Marijuana/Hashish         Cocaine/Crack         Meth/Amphetamines         Heroin         Other Opiates/Synthetics         Inhalants         Benzodiazepines         Hallucinogens         Barbiturates/Sedatives/Hypnotics         Over-the-Counter Drugs         Other         Nicotine             Dimension I Risk Ratin  Reason Risk Rating Assigned:     Client reports weekly use of alcohol which resulted in a DUI charge.  Client reports last date of use being 19.  Client denies current withdrawal issues.    Dimension II Biomedical Conditions:    Any known health conditions: Yes[]/No[x]    Ever previously treated/diagnosed with any eating disorder?  YES []/[x] NO  Explain:     List Health Concerns/Conditions Reported: Denies    Are Health Concerns/Conditions being treated? YES[]/NO []  By Whom? Denies primary clinic or physician.  Are you pregnant: N/A  Dimension II Risk Ratin  Reason Risk Rating Assigned:     Client denies current medical issues.  Client denies a current primary care clinic or physician.  Client has health insurance and access to healthcare services.    Dimension III Emotional/Behavioral/Cognitive:    Oriented to person, place, time, situation? YES []/ NO [x]   Current Mental Health Services: YES []/ NO [x]  Past Hospitalization for MH or psychiatric problems: YES[] / NO[x]  How many Hospitalizations: 0   Last Hospitalization; date and location: Denies      Past or Current Issues with Gambling (Explain): Denies    Prior Treatment for Gambling: YES[] / NO[x]  MH  Diagnoses:    Denies  Psychiatrist: None     Clinic: N/A    Current Psychotropic Medications:  Denies    Taking medications as prescribed:  YES[] / NO[]    Medications Helpful: YES[] / NO[]    Current Suicidal Ideation: YES[] / NO[x]  If yes, any plan?  YES[] / NO[x]  What is plan?:        Previous Suicide Attempts?  YES[] / NO[x]  Explain:       Current Homicidal Ideation: YES[]/NO[x] If yes, any plan? YES[]/NO[x]  What is plan?:      Previous Homicide Attempts? YES[]/NO[x]Explain:      Suicidal/Homicidal Ideation in last 30 days? YES[]/NO [x] (Explain)       Family history of substance and/or mental health diagnosis/issues?  YES[x]/NO [] (Explain)  Grandmother    History of abuse (Physical, Emotional, Sexual)? YES[]/NO [x] (Explain)         Dimension III Risk Ratin  Reason Risk Rating Assigned:     Client denies mental health diagnosis or provider.  Client denies history or current psychological significant stressors.  Client denies history or abuse.  Client denies history or current SI/SIB.    Dimension IV Readiness to Change:    Mandated, or coerced into assessment or treatment:  YES[] / NO  [x]  Does client feel there is a problem:  YES[x] / NO[]  Verbalization of need/desire to change:  YES []/ NO[x]    Impression of : (Check all that apply):  []Cooperative, [] genuinely motivated, [x]ambivalent about change, []minimal awareness, [] low motivation, []minimally cooperative, []non-compliant, []overtly hostile, []unwilling to explore change  Are there any spiritual, cultural, or other special needs to be addressed for client to be successful in treatment? Yes[]/No  [x]   Explain:       Hazardous activities engaged in which placed self or others in danger (i.e., operating a motor vehicle, unsafe sex, sharing needles, etc.)?   Driving      Dimension IV Risk Ratin  Reason Risk Rating Assigned:     Client has external motivation for treatment services through probation.  Client has been using alcohol  "and received a DUI while on probation.  Client refers to self as a \"social\" alcohol user.  Client reports willingness to participate in treatment services.         Dimension V Relapse/Continued Use/Continued Problem Potential     Client age at First Treatment: 22  Lifetime # of CD Treatments:  1  List program, dates, and status of completion (within last five years):     Longest Period of Abstinence: 3.5 years  How did you accomplish this? Incarcerated     Risk Taking/Problem Behaviors Related to Use: Driving      Dimension V Risk Ratin  Reason Risk Rating Assigned:     Client reports longest period of abstinence from substance use has been 3.5 years due to incarceration.  Client has been through treatment in the past due to marijuana use.  Client had continued to use alcohol despite probation.  Client's last date of use has been challenged from collateral on original assessment.      Dimension VI Recovery Environment   Family support:  YES[x] / NO[]  Peer Sober Support:  YES[x] / NO []  Current living circumstances:  Residing with sister.  Specific activities participating in which do not involve substance use:  Spending time with children.  Specific activities participating in which do involve substance use:  Denies    Environment supportive of recovery:  YES[x] / NO[]  People, things that threaten recovery: YES[]/NO  [x]  Explain:    Expected family involvement during treatment services:  Denies  Current Legal Involvement:  Current for possession of a fire arm.  Received a DUI while on probation in January.  Legal Consequences related to use: See above.  Occupational/Academic consequences related to use: Denies  Current support network for recovery (including community-based recovery support): Denies  Do you belong to a [place]: YES[]/NO[x] Which [place]?   Reside on reservation: YES[]/NO[x]    Dimension VI Risk Rating: 3  Reason Risk Rating Assigned:     Client is unemployed with no form of income.  Client " has no current daily structure.  Client is currently residing with sister.  Client is on probation for possession of a firearm and received a DUI on 1/22/17,      DSM-V Criteria for Substance Abuse  Instructions:  Determine whether the client currently meets the criteria for a Substance Use Disorder using the diagnostic criteria in the  DSM-V, pp. 481-589. Current means during the most recent 12 months outside a facility that controls access to substances.    Category of substance Severity ICD-10 Code/DSM V Code  Alcohol Use Disorder Mild  Moderate  Severe (F10.10) (305.00)  (F10.20) (303.90)  (F10.20) (303.90)   Cannabis Use Disorder Mild  Moderate  Severe (F12.10) (305.20)  (F12.20) (304.30)  (F12.20) (304.30)   Hallucinogen Use Disorder Mild  Moderate  Severe (F16.10) (305.30)  (F16.20) (304.50)  (F16.20) (304.50)   Inhalant Use Disorder Mild  Moderate  Severe (F18.10) (305.90)  (F18.20) (304.60)  (F18.20) (304.60)   Opioid Use Disorder Mild  Moderate  Severe (F11.10) (305.50)  (F11.20) (304.00)  (F11.20) (304.00)   Sedative, Hypnotic, or Anxiolytic Use Disorder Mild  Moderate  Severe (F13.10) (305.40)  (F13.20) (304.10)  (F13.20) (304.10)   Stimulant Related Disorders Mild            Moderate            Severe   (F15.10) (305.70) Amphetamine type substance  (F14.10) (305.60) Cocaine  (F15.10) (305.70) Other or unspecified stimulant    (F15.20) (304.40) Amphetamine type substance  (F14.20) (304.20) Cocaine  (F15.20) (304.40) Other or unspecified stimulant    (F15.20) (304.40) Amphetamine type substance  (F14.20) (304.20) Cocaine  (F15.20) (304.40) Other or unspecified stimulant   DisorderTobacco use Disorder Mild  Moderate  Severe   (Z72.0) (305.1)  (F17.200) (305.1)  (F17.200) (305.1)   Other (or unknown) Substance Use Disorder Mild  Moderate  Severe (F19.10) (305.90)  (F19.20) (304.90)  (F19.20) (304.90)     Diagnostic Impression:___f10.20_________________________________    Assessment Completed Within 3  Sessions of Admission: YES[x]/NO[]  If NO, date assessment to be completed noted in Treatment Plan: YES[]/NO[]  Signature of Counselor:__AGUSTO Rodriguez________________________ Date and Time of Signature: ___3/13/2017, 2:35 PM________________